# Patient Record
Sex: FEMALE | Race: BLACK OR AFRICAN AMERICAN | Employment: OTHER | URBAN - METROPOLITAN AREA
[De-identification: names, ages, dates, MRNs, and addresses within clinical notes are randomized per-mention and may not be internally consistent; named-entity substitution may affect disease eponyms.]

---

## 2020-12-04 ENCOUNTER — HOSPITAL ENCOUNTER (OUTPATIENT)
Dept: PHYSICAL THERAPY | Age: 65
Discharge: HOME | End: 2020-12-04
Payer: MEDICAID

## 2020-12-04 PROCEDURE — 97162 PT EVAL MOD COMPLEX 30 MIN: CPT
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
## 2020-12-04 NOTE — PROGRESS NOTES
PHYSICAL THERAPY - DAILY TREATMENT NOTE    Patient Name:  [address]        Date: 2020  : 1955   YES Patient  Verified  Visit #:     Insurance: Payor: [employer] / Plan: [address] [place] [address] / Product Type: Managed Care Medicaid /      In time: 8:30 Out time: 9:00   Total Treatment Time: 30     Medicare/BCBS Time Tracking (below)   Total Timed Codes (min):  na 1:1 Treatment Time:  na     TREATMENT AREA =  L shoulder pain (M25.512)    SUBJECTIVE    Pain Level (on 0 to 10 scale):  0  / 10   Medication Changes/New allergies or changes in medical history, any new surgeries or procedures? NO    If yes, update Summary List   Subjective Functional Status/Changes:  []  No changes reported     See POC          OBJECTIVE    10 NB min Self Care: Reviewed diagnosis, prognosis, therapy progression   Rationale:    Improve understanding of injury and therapy to have realistic expectation of therapy to improve compliance/adherence and satisfaction    Billed With/As:   [] TE   [] TA   [] Neuro   [x] Self Care Patient Education: [] Review HEP    [] Progressed/Changed HEP based on:   [] positioning   [] body mechanics   [] transfers   [] heat/ice application    [x] other: Reviewed diagnosis, prognosis, therapy progression       Other Objective/Functional Measures:    Evaluation only. Instructed on shoulder table slides and pendulum. Post Treatment Pain Level (on 0 to 10) scale:   0 / 10     ASSESSMENT  Assessment/Changes in Function:     See POC     []  See Progress Note/Recertification   Patient will continue to benefit from skilled PT services to modify and progress therapeutic interventions, address functional mobility deficits, address ROM deficits, address strength deficits, analyze and address soft tissue restrictions, analyze and cue movement patterns and analyze and modify body mechanics/ergonomics to attain goals per POC.    Progress toward goals / Updated goals:    See POC PLAN  [x]  Upgrade activities as tolerated YES Continue plan of care   []  Discharge due to :    [x]  Other: 2x week for 6 weeks      Therapist: Erica Kearney PT, DPT    Date: 12/4/2020 Time: 9:08 AM

## 2021-01-04 ENCOUNTER — HOSPITAL ENCOUNTER (OUTPATIENT)
Dept: PHYSICAL THERAPY | Age: 66
Discharge: HOME | End: 2021-01-04
Payer: MEDICAID

## 2021-01-04 PROCEDURE — 97530 THERAPEUTIC ACTIVITIES: CPT

## 2021-01-04 PROCEDURE — 97110 THERAPEUTIC EXERCISES: CPT

## 2021-01-04 PROCEDURE — 97535 SELF CARE MNGMENT TRAINING: CPT

## 2021-01-04 NOTE — PROGRESS NOTES
100 [facility] PHYSICAL THERAPY  [address] [place] [address] [address],[place] Estrada, 70 [facility] - Phone: [phone]  Fax: [phone]  PROGRESS NOTE  Patient Name: Maddy Mayorga : 1955   Treatment/Medical Diagnosis: Left shoulder pain [M25.512]   Referral Source: Magaly Gomez MD     Date of Initial Visit: 2020 Attended Visits: 2 Missed Visits: 0     SUMMARY OF TREATMENT  Patient is a 59 yo female attending 2 sessions (including initial evaluation) of OPPT at [facility] for Left shoulder pain (M25.512). Skilled physical therapy has consisted of therapeutic exercise with focus on L shoulder ROM, therapeutic activity with focus on reaching ability, manual therapy for improved L shoulder ROM, and modalities as needed for pain. CURRENT STATUS  Patient has only attended evaluation and 1 follow-up in the past month, unable to formally assess progress to date due to lack of attendance. Patient reports she is a little bit worse than she was at evaluation because she used her L arm more than typical over the holiday. ROM:   Left UE: flex 70* active/ 85* passive, ext 30*, abd 45*, Functional ER forehead, Functional IR belt-line,  ER 0*  Right UE: flex 170*, ext 45*, abd 165*, Functional ER T1, Functional IR T6,  ER 50*  Strength:   Left UE: flex 2+/5 , ext 4+/5, ER 4+/5, IR 5/5, Upper Trap 5/5, Bicep 5/5, Triceps 4+/5  FOTO not tested due to lack of attendance      Short Term Goal/Measure of Progress Goal Met? 1. Patient will demonstrate independence with HEP for self management of symptoms   Status at last Eval: Initial Eval  Current Status: HEP provided no   2. Decrease max pain to 4-5/10 o assist with overhead reaching   Status at last Eval: 10/10 max pain Current Status: 10/10 max pain no     Long Term Goal/Measure of Progress Goal Met? 1. Decrease max pain 1-2/10 to assist with overhead reaching and dressing.    Status at last Eval: 10/10 max pain Current Status: 10/10 max pain no   2. Improve FOTO Functional Status Score by 17 points in order to show significant functional improvement. Status at last Eval: 44/100 Current Status: NT no   3. Will rate a +5 on Global Rating of Change and be prepared to DC to HEP. Status at last Eval: Initial Eval Current Status: -2 no     4. Demonstrate L shoulder BO to T1 and FIR to 2\" below RUE for improved ability to perform ADLs. Status at last Eval: BO: forehead  FIR: belt-line Current Status: BO: forehead  FIR: belt-line no     Goals to be achieved in __6__  weeks:  1. Continue STG #1 and #2 above  2. Continue LTG #1-4 above     RECOMMENDATIONS  Patient will continue to benefit from skilled physical therapy in order to address remaining deficits so that patient can demonstrate improved LUE ROM and ADLs ability. If you have any questions/comments please contact us directly at [phone]. Thank you for allowing us to assist in the care of your patient. Therapist Signature: Joshua Kwan, DPDANILO  Date: 1/4/2021     Time: 7:19 AM   NOTE TO PHYSICIAN:  PLEASE COMPLETE THE ORDERS BELOW AND FAX TO   [facility]: [phone]  If you are unable to process this request in 24 hours please contact our office: [phone]    ___ I have read the above report and request that my patient continue as recommended.   ___ I have read the above report and request that my patient continue therapy with the following changes/special instructions:_________________________________________________________   ___ I have read the above report and request that my patient be discharged from therapy.      Physician Signature:        Date:       Time:

## 2021-01-04 NOTE — PROGRESS NOTES
PHYSICAL THERAPY - DAILY TREATMENT NOTE    Patient Name:  [address]        Date: 2021  : 1955   yes Patient  Verified  Visit #:      of     Insurance: Payor: Kayla Nguyen / Plan: [address] [place] [address] / Product Type: Managed Care Medicaid /      In time: 7:10 am Out time: 8:07 am   Total Treatment Time: 57      Medicare/BCBS Time Tracking (below)   Total Timed Codes (min):  na 1:1 Treatment Time:  na     TREATMENT AREA =  Left shoulder pain [M25.512]    SUBJECTIVE  Pain Level (on 0 to 10 scale):  2-3  / 10   Medication Changes/New allergies or changes in medical history, any new surgeries or procedures?    no  If yes, update Summary List   Subjective Functional Status/Changes:  []  No changes reported     Patient reports her shoulder isn't feeling too badly this morning, since its early. Did have pain and trouble getting on her seatbelt.            OBJECTIVE  Modalities Rationale:     decrease pain and increase tissue extensibility to improve patient's ability to perform ADLs   min [] Estim, type/location:                                      []  att     []  unatt     []  w/US     []  w/ice    []  w/heat    min []  Mechanical Traction: type/lbs                   []  pro   []  sup   []  int   []  cont    []  before manual    []  after manual    min []  Ultrasound, settings/location:      min []  Iontophoresis w/ dexamethasone, location:                                               []  take home patch       []  in clinic   10 min []  Ice     [x]  Heat    location/position: L shoulder in semi-reclined with knee bolster; end of session     min []  Vasopneumatic Device, press/temp:     min []  Other:    [x] Skin assessment post-treatment (if applicable):    [x]  intact    []  redness- no adverse reaction     []redness  adverse reaction:        15 min Therapeutic Exercise:  [x]  See flow sheet   Rationale:      increase ROM, increase strength and improve coordination to improve the patients ability to perform ADLs     7 min Manual Therapy: PROM L shoulder flexion, abd, ER; STM posterior capsule   Rationale:      decrease pain, increase ROM and increase tissue extensibility to improve patient's ability to perform ADLs and household duties  The manual therapy interventions were performed at a separate and distinct time from the therapeutic activities interventions. 15 min Therapeutic Activity: [x]  See flow sheet   Rationale:    increase ROM, increase strength and improve coordination to improve the patients ability to performing lifting and overhead reaching     10 min Self Care: Educated patient on POC and reassessed due to 1 month from evaluation. Provided patient with HEP and educated and performance. Rationale:     Improve understanding of injury and therapy to have realistic expectation of therapy to improve compliance/adherence and satisfaction    Billed With/As:   [x] TE   [x] TA   [] Neuro   [] Self Care Patient Education: [x] Review HEP    [] Progressed/Changed HEP based on:   [] positioning   [] body mechanics   [] transfers   [] heat/ice application    [x] other: Provided patient with HEP      Other Objective/Functional Measures:    Instructed patient in exercises per flow sheet with a focus on L shoulder ROM. Patient demonstrates very low tolerance for overall activity, becoming winding and SOB with standing exercises. Achieved about 95* flexion during pulleys. Reassessed due to 1 month gap between evaluation and follow-up session. Post Treatment Pain Level (on 0 to 10) scale:   0  / 10     ASSESSMENT  Assessment/Changes in Function:     Patient with decreased pain at end of session. Overall low tolerance for activity, especially standing exercises. Will benefit from modified exercise routine in seated or supine position.       []  See Progress Note/Recertification   Patient will continue to benefit from skilled PT services to modify and progress therapeutic interventions, address functional mobility deficits, address ROM deficits, address strength deficits, analyze and address soft tissue restrictions, analyze and cue movement patterns and analyze and modify body mechanics/ergonomics to attain remaining goals.    Progress toward goals / Updated goals:    See progress note     PLAN  [x]  Upgrade activities as tolerated yes Continue plan of care   []  Discharge due to :    []  Other:      Therapist: Brittanie Aguilar PT, DPT     Date: 1/4/2021 Time: 7:13 AM     Future Appointments   Date Time Provider Rubio Velasco   1/8/2021  9:30 AM Reny Vallejo, PTA [facility]   1/11/2021  7:00 AM Candis Reilly April, PT [facility]   1/13/2021  8:00 AM Candis Reilly April, PT [facility]   1/18/2021  7:00 AM Candis Reilly April, PT [facility]   1/20/2021  8:00 AM Candis Reilly April, [facility]   1/25/2021  7:00 AM Candis Reilly April, [facility]   1/27/2021  8:00 AM Candis Reilly April, PT [address]

## 2021-01-06 ENCOUNTER — APPOINTMENT (OUTPATIENT)
Dept: PHYSICAL THERAPY | Age: 66
End: 2021-01-06
Payer: MEDICAID

## 2021-01-08 ENCOUNTER — APPOINTMENT (OUTPATIENT)
Dept: PHYSICAL THERAPY | Age: 66
End: 2021-01-08
Payer: MEDICAID

## 2021-01-11 ENCOUNTER — HOSPITAL ENCOUNTER (OUTPATIENT)
Dept: PHYSICAL THERAPY | Age: 66
Discharge: HOME | End: 2021-01-11
Payer: MEDICAID

## 2021-01-11 PROCEDURE — 97112 NEUROMUSCULAR REEDUCATION: CPT

## 2021-01-11 PROCEDURE — 97530 THERAPEUTIC ACTIVITIES: CPT

## 2021-01-11 PROCEDURE — 97110 THERAPEUTIC EXERCISES: CPT

## 2021-01-11 NOTE — PROGRESS NOTES
PHYSICAL THERAPY - DAILY TREATMENT NOTE    Patient Name:  [address]        Date: 2021  : 1955   yes Patient  Verified  Visit #:   3   of   12  Insurance: Payor: Alonso Masterson / Plan: [address] [place] [address] / Product Type: Managed Care Medicaid /      In time: 7:08 am Out time: 8:00 am   Total Treatment Time: 52     Medicare/BCBS Time Tracking (below)   Total Timed Codes (min):  42 1:1 Treatment Time:  42     TREATMENT AREA =  Left shoulder pain [M25.512]    SUBJECTIVE  Pain Level (on 0 to 10 scale):  0  / 10   Medication Changes/New allergies or changes in medical history, any new surgeries or procedures?    no  If yes, update Summary List   Subjective Functional Status/Changes:  []  No changes reported     Patient reports her shoulder is feeling better because she hasn't been cooking as much at home. Felt really good after the last session.            OBJECTIVE  Modalities Rationale:     decrease pain and increase tissue extensibility to improve patient's ability to perform ADLs with decreased pain    min [] Estim, type/location:                                      []  att     []  unatt     []  w/US     []  w/ice    []  w/heat    min []  Mechanical Traction: type/lbs                   []  pro   []  sup   []  int   []  cont    []  before manual    []  after manual    min []  Ultrasound, settings/location:      min []  Iontophoresis w/ dexamethasone, location:                                               []  take home patch       []  in clinic   10 NB min []  Ice     [x]  Heat    location/position: L shoulder in long sitting; post-session     min []  Vasopneumatic Device, press/temp:     min []  Other:    [x] Skin assessment post-treatment (if applicable):    [x]  intact    []  redness- no adverse reaction     []redness  adverse reaction:        15 min Therapeutic Exercise:  [x]  See flow sheet   Rationale:      increase ROM, increase strength and improve coordination to improve the patients ability to perform overhead reaching with decreased pain      15 min Therapeutic Activity: [x]  See flow sheet   Rationale:    increase ROM and increase strength to improve the patients ability to perform ADLs and lifting with decreased pain     12 min Neuromuscular Re-ed: [x]  See flow sheet   Rationale:    increase ROM and improve coordination to improve the patients ability to tolerate LUE reaching with decreased pain and improved posture awareness    Billed With/As:   [x] TE   [x] TA   [] Neuro   [] Self Care Patient Education: [x] Review HEP    [] Progressed/Changed HEP based on:   [] positioning   [] body mechanics   [] transfers   [] heat/ice application    [] other:      Other Objective/Functional Measures: Added pendulum with cuing for correct positioning and relaxation of LUE. Cuing during pulleys to allow LUE to relax, patient able to tolerate about 170 flexion by end of 4 minutes. Added supine cane flexion with patient reporting increased discomfort with repetitive activity. Post Treatment Pain Level (on 0 to 10) scale:   0  / 10     ASSESSMENT  Assessment/Changes in Function:     Patient demonstrates good tolerance for exercise and activity today though slight increase in pain during repetitive shoulder flexion. No pain at end of session. []  See Progress Note/Recertification   Patient will continue to benefit from skilled PT services to modify and progress therapeutic interventions, address functional mobility deficits, address ROM deficits, address strength deficits, analyze and address soft tissue restrictions, analyze and cue movement patterns and analyze and modify body mechanics/ergonomics to attain remaining goals.    Progress toward goals / Updated goals:    Met STG #1     PLAN  [x]  Upgrade activities as tolerated yes Continue plan of care   []  Discharge due to :    []  Other:      Therapist: Elena Miguel PT, DPT     Date: 1/11/2021 Time: 10:03 AM     Future Appointments   Date Time Provider Rubio Velasco   1/13/2021  8:00 AM Lyndol Liter, April, [facility]   1/18/2021  7:00 AM Lyndol Liter, April, [facility]   1/20/2021  8:00 AM Lyndol Liter, April, [facility]   1/25/2021  7:00 AM Lyndol Liter, April, [place] [address]   1/27/2021  8:00 AM Lyndol Liter, April, [place] [address]

## 2021-01-13 ENCOUNTER — HOSPITAL ENCOUNTER (OUTPATIENT)
Dept: PHYSICAL THERAPY | Age: 66
Discharge: HOME | End: 2021-01-13
Payer: MEDICAID

## 2021-01-13 PROCEDURE — 97112 NEUROMUSCULAR REEDUCATION: CPT

## 2021-01-13 PROCEDURE — 97530 THERAPEUTIC ACTIVITIES: CPT

## 2021-01-13 PROCEDURE — 97110 THERAPEUTIC EXERCISES: CPT

## 2021-01-13 NOTE — PROGRESS NOTES
PHYSICAL THERAPY - DAILY TREATMENT NOTE    Patient Name:  [address]        Date: 2021  : 1955   yes Patient  Verified  Visit #:     Insurance: Payor: Gisele Ganser / Plan: [address] [place] [address] / Product Type: Managed Care Medicaid /      In time: 8:03 am Out time: 8:58 am   Total Treatment Time: 55     Medicare/BCBS Time Tracking (below)   Total Timed Codes (min):  45 1:1 Treatment Time:  45     TREATMENT AREA =  Left shoulder pain [M25.512]    SUBJECTIVE  Pain Level (on 0 to 10 scale):  0  / 10   Medication Changes/New allergies or changes in medical history, any new surgeries or procedures?    no  If yes, update Summary List   Subjective Functional Status/Changes:  []  No changes reported     Patient reports her shoulder really is starting to feel better, noticing she is able to use it for longer periods without it hurting as much. Muscles are starting to loosen up.        OBJECTIVE  Modalities Rationale:     decrease pain and increase tissue extensibility to improve patient's ability to perform reaching with decreased pain    min [] Estim, type/location:                                      []  att     []  unatt     []  w/US     []  w/ice    []  w/heat    min []  Mechanical Traction: type/lbs                   []  pro   []  sup   []  int   []  cont    []  before manual    []  after manual    min []  Ultrasound, settings/location:      min []  Iontophoresis w/ dexamethasone, location:                                               []  take home patch       []  in clinic   10 nb min []  Ice     [x]  Heat    location/position: L shoulder in semi-reclined position with knee bolster    min []  Vasopneumatic Device, press/temp:     min []  Other:    [x] Skin assessment post-treatment (if applicable):    [x]  intact    []  redness- no adverse reaction     []redness  adverse reaction:        15 min Therapeutic Exercise:  [x]  See flow sheet   Rationale:      increase ROM, increase strength and improve coordination to improve the patients ability to perform ADLs and overhead reaching     7 nb min Manual Therapy: PROM L shoulder flexion, abd, ER; STM posterior capsule, lateral deltoid, LHB tendon   Rationale:      decrease pain, increase ROM and increase tissue extensibility to improve patient's ability to perform overhead lifting  The manual therapy interventions were performed at a separate and distinct time from the therapeutic activities interventions. 15 min Therapeutic Activity: [x]  See flow sheet   Rationale:    increase ROM, increase strength and improve coordination to improve the patients ability to perform lifting and overhead lifting     8 min Neuromuscular Re-ed: [x]  See flow sheet   Rationale:    increase ROM and improve coordination to improve the patients ability to perform overhead lifting with decreased pain     Billed With/As:   [] TE   [] TA   [] Neuro   [] Self Care Patient Education: [x] Review HEP    [] Progressed/Changed HEP based on:   [] positioning   [] body mechanics   [] transfers   [] heat/ice application    [] other:      Other Objective/Functional Measures: Added bodyblade (small) with cuing of proper performance in order to working on shoulder endurance & awareness and to replicate repetitive nature of cooking. Patient requiring cuing for proper performance of scap squeeze as patient shrugging. Demonstrates improved forward flexion during pulleys. Post Treatment Pain Level (on 0 to 10) scale:   0  / 10     ASSESSMENT  Assessment/Changes in Function:     Patient with good tolerance of exercises and activities today, reporting soreness with repetitive flexion. Reports no pain at end of session.       []  See Progress Note/Recertification   Patient will continue to benefit from skilled PT services to modify and progress therapeutic interventions, address functional mobility deficits, address ROM deficits, address strength deficits, analyze and address soft tissue restrictions, analyze and cue movement patterns and analyze and modify body mechanics/ergonomics to attain remaining goals. Progress toward goals / Updated goals: · Short Term Goals: To be accomplished in  2  weeks:  1. Patient will demonstrate independence with HEP for self management of symptoms MET  2. Decrease max pain to 4-5/10 o assist with overhead reaching Progressing 1/13, pain 0/10 at start and end of session, up to 4/10 during exercises  · Long Term Goals: To be accomplished in  6  weeks:  1. Decrease max pain 1-2/10 to assist with overhead reaching and dressing. 2.  Improve FOTO Functional Status Score by 17 points in order to show significant functional improvement. 3.  Will rate a +5 on Global Rating of Change and be prepared to DC to HEP. 4. Demonstrate L shoulder BO to T1 and FIR to 2\" below RUE for improved ability to perform ADLs.      PLAN  [x]  Upgrade activities as tolerated yes Continue plan of care   []  Discharge due to :    []  Other:      Therapist: Nahomi Dickey, PT    Date: 1/13/2021 Time: 8:05 AM     Future Appointments   Date Time Provider Rubio Velasco   1/18/2021  7:00 AM [address] [facility]   1/20/2021  8:00 AM [address] [facility]   1/25/2021  7:00 AM Connee Si, [place], [place] [address] [facility]   1/27/2021  8:00 AM [facility], PT [address]

## 2021-01-18 ENCOUNTER — HOSPITAL ENCOUNTER (OUTPATIENT)
Dept: PHYSICAL THERAPY | Age: 66
Discharge: HOME | End: 2021-01-18
Payer: MEDICAID

## 2021-01-18 PROCEDURE — 97110 THERAPEUTIC EXERCISES: CPT

## 2021-01-18 PROCEDURE — 97530 THERAPEUTIC ACTIVITIES: CPT

## 2021-01-18 PROCEDURE — 97112 NEUROMUSCULAR REEDUCATION: CPT

## 2021-01-18 NOTE — PROGRESS NOTES
PHYSICAL THERAPY - DAILY TREATMENT NOTE    Patient Name: Rolly Borrego        Date: 2021  : 1955   yes Patient  Verified  Visit #:     Insurance: Payor: [address] / Plan: [address] / Product Type: Managed Care Medicaid /      In time: 7:11 Out time: 8:00   Total Treatment Time: 49     Medicare/BCBS Time Tracking (below)   Total Timed Codes (min):  39 1:1 Treatment Time:  39     TREATMENT AREA =  Left shoulder pain [M25.512]    SUBJECTIVE  Pain Level (on 0 to 10 scale):  3-4  / 10   Medication Changes/New allergies or changes in medical history, any new surgeries or procedures?    no  If yes, update Summary List   Subjective Functional Status/Changes:  []  No changes reported     Patient reports her shoulder is hurting a little bit this morning because her washing machine was leaking so she had to move it last night.         OBJECTIVE  Modalities Rationale:     decrease pain and increase tissue extensibility to improve patient's ability to tolerate light household duties and overhead reaching   min [] Estim, type/location:                                      []  att     []  unatt     []  w/US     []  w/ice    []  w/heat    min []  Mechanical Traction: type/lbs                   []  pro   []  sup   []  int   []  cont    []  before manual    []  after manual    min []  Ultrasound, settings/location:      min []  Iontophoresis w/ dexamethasone, location:                                               []  take home patch       []  in clinic   10 min []  Ice     [x]  Heat    location/position: L shoulder in semi-reclined position with knee bolster    min []  Vasopneumatic Device, press/temp:     min []  Other:    [x] Skin assessment post-treatment (if applicable):    [x]  intact    []  redness- no adverse reaction     []redness  adverse reaction:        15 min Therapeutic Exercise:  [x]  See flow sheet   Rationale:      increase ROM, increase strength and improve coordination to improve the patients ability to tolerate light household duties      15 min Therapeutic Activity: [x]  See flow sheet   Rationale:    increase ROM, increase strength and improve coordination to improve the patients ability to tolerate lifting and pushing/pulling with decreased pain     9 min Neuromuscular Re-ed: [x]  See flow sheet   Rationale:    improve coordination and increase proprioception to improve the patients ability to  Tolerate light household duties with improved L shoulder and body awareness     Billed With/As:   [x] TE   [x] TA   [] Neuro   [] Self Care Patient Education: [x] Review HEP    [] Progressed/Changed HEP based on:   [] positioning   [] body mechanics   [] transfers   [] heat/ice application    [] other:      Other Objective/Functional Measures: Added seated t-band row and ext with OTB for increased L shoulder and scapular strength & stability, cuing for proper positioning. Increased pain during supine cane flexion. Post Treatment Pain Level (on 0 to 10) scale:   2  / 10     ASSESSMENT  Assessment/Changes in Function:     Decreased pain at end of session. Tolerated new exercise well without causing increase in discomfort. Demonstrates increased tolerance during pulleys, up to 165 deg. []  See Progress Note/Recertification   Patient will continue to benefit from skilled PT services to modify and progress therapeutic interventions, address functional mobility deficits, address ROM deficits, address strength deficits, analyze and address soft tissue restrictions, analyze and cue movement patterns and analyze and modify body mechanics/ergonomics to attain remaining goals. Progress toward goals / Updated goals: · Short Term Goals: To be accomplished in  2  weeks:  1. Patient will demonstrate independence with HEP for self management of symptoms MET  2.  Decrease max pain to 4-5/10 o assist with overhead reaching Progressing 1/18     PLAN  [x]  Upgrade activities as tolerated yes Continue plan of care   []  Discharge due to :    []  Other:      Therapist: Sancho Schwartz, PT, DPT     Date: 1/18/2021 Time: 7:43 AM     Future Appointments   Date Time Provider Rubio Velasco   1/20/2021  8:00 [facility] [address]   1/25/2021  7:00 AM Lynshekhar Mohan, [place], [facility] [address]   1/27/2021  8:00 AM Glen Mohan, [place], PT [address]

## 2021-01-20 ENCOUNTER — HOSPITAL ENCOUNTER (OUTPATIENT)
Dept: PHYSICAL THERAPY | Age: 66
Discharge: HOME | End: 2021-01-20
Payer: MEDICAID

## 2021-01-20 PROCEDURE — 97110 THERAPEUTIC EXERCISES: CPT

## 2021-01-20 PROCEDURE — 97530 THERAPEUTIC ACTIVITIES: CPT

## 2021-01-20 NOTE — PROGRESS NOTES
PHYSICAL THERAPY - DAILY TREATMENT NOTE    Patient Name:  [address]        Date: 2021  : 1955   yes Patient  Verified  Visit #:     Insurance: Payor: [address] / Plan: [address] / Product Type: Managed Care Medicaid /      In time: 8:15 am Out time: 8:58 am   Total Treatment Time: 43     Medicare/BCBS Time Tracking (below)   Total Timed Codes (min):  33 1:1 Treatment Time:  33     TREATMENT AREA =  Left shoulder pain [M25.512]    SUBJECTIVE  Pain Level (on 0 to 10 scale):  2  / 10   Medication Changes/New allergies or changes in medical history, any new surgeries or procedures?    no  If yes, update Summary List   Subjective Functional Status/Changes:  []  No changes reported   (Patient is 15 mins late due to transportation issues)  Patient reports her shoulder is feeling pretty good today. Felt better after the last session.          OBJECTIVE  Modalities Rationale:     decrease pain and increase tissue extensibility to improve patient's ability to tolerate reaching with decreased pain    min [] Estim, type/location:                                      []  att     []  unatt     []  w/US     []  w/ice    []  w/heat    min []  Mechanical Traction: type/lbs                   []  pro   []  sup   []  int   []  cont    []  before manual    []  after manual    min []  Ultrasound, settings/location:      min []  Iontophoresis w/ dexamethasone, location:                                               []  take home patch       []  in clinic   10 min []  Ice     [x]  Heat    location/position: L shoulder in semi-reclined position with bolster    min []  Vasopneumatic Device, press/temp:     min []  Other:    [x] Skin assessment post-treatment (if applicable):    [x]  intact    []  redness- no adverse reaction     []redness  adverse reaction:        18 min Therapeutic Exercise:  [x]  See flow sheet   Rationale:      increase ROM, increase strength and improve coordination to improve the patients ability to tolerate overhead reaching and ADLs with decreased pain      15 min Therapeutic Activity: [x]  See flow sheet   Rationale:    increase ROM, increase strength and improve coordination to improve the patients ability to tolerate lifting and reaching with decreased pain     Billed With/As:   [x] TE   [x] TA   [] Neuro   [] Self Care Patient Education: [x] Review HEP    [] Progressed/Changed HEP based on:   [] positioning   [] body mechanics   [] transfers   [] heat/ice application    [] other:      Other Objective/Functional Measures:    No change in program today. Patient demonstrates improved tolerance for L shoulder flexion during pulleys, up to 165 deg. Post Treatment Pain Level (on 0 to 10) scale:   0  / 10     ASSESSMENT  Assessment/Changes in Function:     Patient with improving L shoulder ROM and decreased pain at end of session. []  See Progress Note/Recertification   Patient will continue to benefit from skilled PT services to modify and progress therapeutic interventions, address functional mobility deficits, address ROM deficits, address strength deficits, analyze and address soft tissue restrictions, analyze and cue movement patterns and analyze and modify body mechanics/ergonomics to attain remaining goals. Progress toward goals / Updated goals:  Short Term Goals: To be accomplished in  2  weeks:  1. Patient will demonstrate independence with HEP for self management of symptoms MET  2.  Decrease max pain to 4-5/10 o assist with overhead reaching Progressing 1/20, max pain 2/10 for past 3 days     PLAN  [x]  Upgrade activities as tolerated yes Continue plan of care   []  Discharge due to :    []  Other:      Therapist: Erica Saunders PT, DPT     Date: 1/20/2021 Time: 8:16 AM     Future Appointments   Date Time Provider Rubio Velasco   1/25/2021  7:00 AM Jennifer Sessions, [place], [place] [facility]   1/27/2021  8:00 AM Jennifer Davidson, April, [address] [facility]   2/1/2021 10:15 AM [facility]   2/3/2021 10:15 AM [facility]   2/8/2021  8:45 AM Woody HamiltonTC [facility]   2/10/2021  8:45 AM Woody HamiltonTC [facility]   2/15/2021  9:30 AM Woody HamiltonTC [facility]   2/17/2021  8:45 AM Woody HamiltonTC [facility]   2/22/2021  8:45 AM Woody HamiltonTC [facility]   2/24/2021  8:45 AM Mohsen Son  [facility]

## 2021-01-25 ENCOUNTER — HOSPITAL ENCOUNTER (OUTPATIENT)
Dept: PHYSICAL THERAPY | Age: 66
Discharge: HOME | End: 2021-01-25
Payer: MEDICAID

## 2021-01-25 PROCEDURE — 97110 THERAPEUTIC EXERCISES: CPT

## 2021-01-25 PROCEDURE — 97530 THERAPEUTIC ACTIVITIES: CPT

## 2021-01-25 PROCEDURE — 97112 NEUROMUSCULAR REEDUCATION: CPT

## 2021-01-27 ENCOUNTER — HOSPITAL ENCOUNTER (OUTPATIENT)
Dept: PHYSICAL THERAPY | Age: 66
Discharge: HOME | End: 2021-01-27
Payer: MEDICAID

## 2021-01-27 PROCEDURE — 97110 THERAPEUTIC EXERCISES: CPT

## 2021-01-27 PROCEDURE — 97530 THERAPEUTIC ACTIVITIES: CPT

## 2021-01-27 PROCEDURE — 97112 NEUROMUSCULAR REEDUCATION: CPT

## 2021-01-27 NOTE — PROGRESS NOTES
PHYSICAL THERAPY - DAILY TREATMENT NOTE    Patient Name:  [address]        Date: 2021  : 1955   yes Patient  Verified  Visit #:     Insurance: Payor: [address] / Plan: [address] / Product Type: Managed Care Medicaid /      In time: 8:04 am Out time: 9:07   Total Treatment Time: 63     Medicare/BCBS Time Tracking (below)   Total Timed Codes (min):  53 1:1 Treatment Time:  53     TREATMENT AREA =  Left shoulder pain [M25.512]    SUBJECTIVE  Pain Level (on 0 to 10 scale):  4  / 10   Medication Changes/New allergies or changes in medical history, any new surgeries or procedures?    no  If yes, update Summary List   Subjective Functional Status/Changes:  []  No changes reported     Patient reports her shoulder is hurting a little bit this morning because she cleaned out her oven.          OBJECTIVE  Modalities Rationale:     decrease pain and increase tissue extensibility to improve patient's ability to perform ADLs   min [] Estim, type/location:                                      []  att     []  unatt     []  w/US     []  w/ice    []  w/heat    min []  Mechanical Traction: type/lbs                   []  pro   []  sup   []  int   []  cont    []  before manual    []  after manual    min []  Ultrasound, settings/location:      min []  Iontophoresis w/ dexamethasone, location:                                               []  take home patch       []  in clinic   10 min []  Ice     [x]  Heat    location/position: L shoulder in semi-reclined with bolster    min []  Vasopneumatic Device, press/temp:     min []  Other:    [x] Skin assessment post-treatment (if applicable):    [x]  intact    []  redness- no adverse reaction     []redness - adverse reaction:        20 min Therapeutic Exercise:  [x]  See flow sheet   Rationale:      increase ROM, increase strength and improve coordination to improve the patients ability to perform ADLs     7 nb min Manual Therapy: STM/DTM L UT, posterior rotator cuff, pec major, lat deltoid   Rationale:      decrease pain, increase ROM and increase tissue extensibility to improve patient's ability to perform ADLs  The manual therapy interventions were performed at a separate and distinct time from the therapeutic activities interventions. 18 min Therapeutic Activity: [x]  See flow sheet   Rationale:    increase ROM, increase strength and improve coordination to improve the patients ability to perform ADLs and cleaning with decreased pain     8 min Neuromuscular Re-ed: [x]  See flow sheet   Rationale:    increase strength, improve coordination and increase proprioception to improve the patients ability to perform ADLs    Billed With/As:   [x] TE   [x] TA   [] Neuro   [] Self Care Patient Education: [x] Review HEP    [] Progressed/Changed HEP based on:   [] positioning   [] body mechanics   [] transfers   [] heat/ice application    [] other:      Other Objective/Functional Measures: Added IR/ER isometric for rotator cuff strength. Demonstrates significant shoulder hike during active shoulder scaption. Supine PROM L shoulder flex 124*     Post Treatment Pain Level (on 0 to 10) scale:   0  / 10     ASSESSMENT  Assessment/Changes in Function:     Patient demonstrates improved L shoulder PROM, up to 124* passively. []  See Progress Note/Recertification   Patient will continue to benefit from skilled PT services to modify and progress therapeutic interventions, address functional mobility deficits, address ROM deficits, address strength deficits, analyze and address soft tissue restrictions, analyze and cue movement patterns, analyze and modify body mechanics/ergonomics and assess and modify postural abnormalities to attain remaining goals.    Progress toward goals / Updated goals:    Progressing toward STG #2     PLAN  [x]  Upgrade activities as tolerated yes Continue plan of care   []  Discharge due to :    []  Other: Therapist: Joshua Wang, DPT     Date: 1/27/2021 Time: 8:06 AM     Future Appointments   Date Time Provider Rubio Velasco   2/1/2021 10:15 AM Stacey Paulson [facility]   2/3/2021 10:15 AM Stacey Paulson [facility]   2/8/2021  8:45 AM Marcie Hamilton [facility]   2/10/2021  8:45 AM Marcie Hamilton [facility]   2/15/2021  9:30 AM Marcie Hamilton [facility]   2/17/2021  8:45 AM Marcie Hamilton [facility]   2/22/2021  8:45 AM Marcie Hamilton [facility]   2/24/2021  8:45 AM Marcie Hamilton [facility]

## 2021-02-01 ENCOUNTER — HOSPITAL ENCOUNTER (OUTPATIENT)
Dept: PHYSICAL THERAPY | Age: 66
Discharge: HOME | End: 2021-02-01
Payer: MEDICAID

## 2021-02-01 PROCEDURE — 97110 THERAPEUTIC EXERCISES: CPT

## 2021-02-01 PROCEDURE — 97530 THERAPEUTIC ACTIVITIES: CPT

## 2021-02-01 NOTE — PROGRESS NOTES
PHYSICAL THERAPY - DAILY TREATMENT NOTE    Patient Name:  [address]        Date: 2021  : 1955   yes Patient  Verified  Visit #:     Insurance: Payor: Noelle Lizandrons / Plan: [address] [place] [address] / Product Type: Managed Care Medicaid /      In time: 842 Out time: 1110   Total Treatment Time: 45     Medicare/BCBS Time Tracking (below)   Total Timed Codes (min):  35 1:1 Treatment Time:  35     TREATMENT AREA =  Left shoulder pain [M25.512]    SUBJECTIVE  Pain Level (on 0 to 10 scale):  3  / 10   Medication Changes/New allergies or changes in medical history, any new surgeries or procedures?    no  If yes, update Summary List   Subjective Functional Status/Changes:  []  No changes reported     This therapy stuff is helping. OBJECTIVE  Modalities Rationale:     decrease pain and increase tissue extensibility to improve patient's ability to perform pain free ADLs. min [] Estim, type/location:                                      []  att     []  unatt     []  w/US     []  w/ice    []  w/heat    min []  Mechanical Traction: type/lbs                   []  pro   []  sup   []  int   []  cont    []  before manual    []  after manual    min []  Ultrasound, settings/location:      min []  Iontophoresis w/ dexamethasone, location:                                               []  take home patch       []  in clinic   10 min []  Ice     [x]  Heat    location/position: Supine, (L) shoulder. min []  Vasopneumatic Device, press/temp:     min []  Other:    [x] Skin assessment post-treatment (if applicable):    [x]  intact    []  redness- no adverse reaction     []redness  adverse reaction:        15 min Therapeutic Exercise:  [x]  See flow sheet   Rationale:      increase ROM and increase strength to improve the patients ability to perform pain free ADLs.      7 (NB) Min Manual Therapy: STM/DTM (L) pec minor.     Rationale:      decrease pain, increase ROM, increase tissue extensibility and decrease trigger points to improve patient's ability to perform pain free ADLs. The manual therapy interventions were performed at a separate and distinct time from the therapeutic activities interventions. 13 min Therapeutic Activity: [x]  See flow sheet   Rationale:    increase ROM and increase strength to improve the patients ability to perform pain free ADLs. Billed With/As:   [x] TE   [] TA   [] Neuro   [] Self Care Patient Education: [x] Review HEP    [] Progressed/Changed HEP based on:   [] positioning   [] body mechanics   [] transfers   [] heat/ice application    [] other:      Other Objective/Functional Measures: Therex per flow sheet. Post Treatment Pain Level (on 0 to 10) scale:   0   / 10     ASSESSMENT  Assessment/Changes in Function:     No exacerbation of symptoms with today's session. []  See Progress Note/Recertification   Patient will continue to benefit from skilled PT services to modify and progress therapeutic interventions, address functional mobility deficits, address ROM deficits, address strength deficits, analyze and address soft tissue restrictions, analyze and cue movement patterns, analyze and modify body mechanics/ergonomics and assess and modify postural abnormalities to attain remaining goals. Progress toward goals / Updated goals:    No change in progress toward LTG's with today's session.       PLAN  [x]  Upgrade activities as tolerated yes Continue plan of care   []  Discharge due to :    []  Other:      Therapist: Ruth Farrell PTA    Date: 2/1/2021 Time: 11:03 AM     Future Appointments   Date Time Provider Rubio Velasco   2/3/2021 10:15 AM Susana Wilkinson [facility] [address]   2/8/2021  8:45 AM HamiltonPiedad [facility] [address]   2/10/2021  8:45 AM HamiltonTeresaa Pufacundo [facility] [address]   2/15/2021  9:30 AM Hamilton, Piedad Roberta [facility] [address]   2/17/2021  8:45 AM Hamilton, Piedad Puffer [facility] [address]   2/22/2021  8:45 AM HamiltonTeresaa Pufacundo [facility] [address]   2/24/2021  8:45 AM Jackelin Hamilton [facility]

## 2021-02-03 ENCOUNTER — HOSPITAL ENCOUNTER (OUTPATIENT)
Dept: PHYSICAL THERAPY | Age: 66
Discharge: HOME | End: 2021-02-03
Payer: MEDICAID

## 2021-02-03 PROCEDURE — 97110 THERAPEUTIC EXERCISES: CPT

## 2021-02-03 PROCEDURE — 97530 THERAPEUTIC ACTIVITIES: CPT

## 2021-02-03 NOTE — PROGRESS NOTES
PHYSICAL THERAPY - DAILY TREATMENT NOTE    Patient Name:  [address]        Date: 2/3/2021  : 1955   yes Patient  Verified  Visit #:   10   of   12  Insurance: Payor: Holly Mar / Plan: [address] [place] [address] / Product Type: Managed Care Medicaid /      In time: 1395 Out time: 1110   Total Treatment Time: 55     Medicare/BCBS Time Tracking (below)   Total Timed Codes (min):  45 1:1 Treatment Time:       TREATMENT AREA =  Left shoulder pain [M25.512]    SUBJECTIVE  Pain Level (on 0 to 10 scale):  3  / 10   Medication Changes/New allergies or changes in medical history, any new surgeries or procedures?    no  If yes, update Summary List   Subjective Functional Status/Changes:  []  No changes reported     I had a rough van ride over here. It was feeling fine before but that Rachele Gleason was bouncing me all over the place. Pt reporting 3/10 recent max pain. Pt reporting 50% improvement in symptoms since beginning PT.         OBJECTIVE  Modalities Rationale:     decrease pain and increase tissue extensibility to improve patient's ability to perform pain free ADLs. min [] Estim, type/location:                                      []  att     []  unatt     []  w/US     []  w/ice    []  w/heat    min []  Mechanical Traction: type/lbs                   []  pro   []  sup   []  int   []  cont    []  before manual    []  after manual    min []  Ultrasound, settings/location:      min []  Iontophoresis w/ dexamethasone, location:                                               []  take home patch       []  in clinic   10 min []  Ice     [x]  Heat    location/position: Supine, (L) shoulder.      min []  Vasopneumatic Device, press/temp:     min []  Other:    [x] Skin assessment post-treatment (if applicable):    [x]  intact    []  redness- no adverse reaction     []redness  adverse reaction:        22 min Therapeutic Exercise:  [x]  See flow sheet   Rationale:      increase ROM and increase strength to improve the patients ability to perform pain free ADLs.       7 (NB) Min Manual Therapy: STM/DTM (L) pec minor. Rationale:      decrease pain, increase ROM, increase tissue extensibility and decrease trigger points to improve patient's ability to perform pain free ADLs. The manual therapy interventions were performed at a separate and distinct time from the therapeutic activities interventions. 16 min Therapeutic Activity: [x]  See flow sheet   Rationale:    increase ROM, increase strength, improve coordination and improve balance to improve the patients ability to perform pain free ADLs. Billed With/As:   [x] TE   [] TA   [] Neuro   [] Self Care Patient Education: [x] Review HEP    [] Progressed/Changed HEP based on:   [] positioning   [] body mechanics   [] transfers   [] heat/ice application    [] other:      Other Objective/Functional Measures:    FOTO = 57   GROC = +7     (L) shoulder AROM:   Flex/Scap = 122 deg   BO to C1   FIR to (L) glute      Post Treatment Pain Level (on 0 to 10) scale:   0   / 10     ASSESSMENT  Assessment/Changes in Function:     See PN     []  See Progress Note/Recertification   Patient will continue to benefit from skilled PT services to modify and progress therapeutic interventions, address functional mobility deficits, address ROM deficits, address strength deficits, analyze and address soft tissue restrictions, analyze and cue movement patterns, analyze and modify body mechanics/ergonomics and assess and modify postural abnormalities to attain remaining goals.    Progress toward goals / Updated goals:    See PN     PLAN  [x]  Upgrade activities as tolerated yes Continue plan of care   []  Discharge due to :    []  Other:      Therapist: Watson Alvarado PTA    Date: 2/3/2021 Time: 10:22 AM     Future Appointments   Date Time Provider Rubio Velasco   2/8/2021  8:45 AM Yashira Hamilton [facility] [address]   2/10/2021  8:45 AM Yashira Hamilton [facility] [address]   2/15/2021  9:30 AM Grey Quintanilla [address] [facility]   2/17/2021  8:45 AM Jose A Hamilton [facility]   2/22/2021  8:45 AM Jose A Hamilton [facility]   2/24/2021  8:45 AM Jose A Hamilton [address] [facility]

## 2021-02-03 NOTE — PROGRESS NOTES
[address] [facility] PHYSICAL THERAPY  [address] [place] [address] 51, [facility] 201,[facility], 70 [facility] - Phone: [phone]  Fax: [phone]  PROGRESS NOTE  Patient Name: Mac Essex : 1955   Treatment/Medical Diagnosis: Left shoulder pain [M25.512]   Referral Source: Ann Stanley MD     Date of Initial Visit: 2020 Attended Visits: 10 Missed Visits: 0     SUMMARY OF TREATMENT  Pt has attended 10 PT sessions consisting of initial evaluation, therapeutic exercises, HEP, manual therapy, patient education, and modalities to improve (L) shoulder PROM, strength, decrease pain and prepare for DC to HEP. CURRENT STATUS  [facility] ([facility]. Pt currently reporting 3/10 recent max pain with 50% improvement in symptoms since beginning PT. Current FOTO = 57, GROC= +7. (L) shoulder ROM remains limited. Current flex/scap = 122 deg, BO to C1, FIR to (L) glute. Pt is partially compliant with HEP. Goal/Measure of Progress Goal Met? 1. Patient will demonstrate independence with HEP for self management of symptoms   Status at last Eval: HEP provided  Current Status: Partially compliant with HEP progressing   2. Improve FOTO Functional Status Score by 17 points in order to show significant functional improvement. Status at last Eval: 44 Current Status: 57 progressing   3. Will rate a +5 on Global Rating of Change and be prepared to DC to HEP. Status at last Eval: -2  Current Status: +7 yes     4. Demonstrate L shoulder BO to T1 and FIR to 2\" below RUE for improved ability to perform ADLs. Status at last Eval: BO forehead  FIR belt-line Current Status: BO C1   FIR (L) glute no     New Goals to be achieved in __4__  weeks:  1. Patient will demonstrate independence with HEP for self management of symptoms  2. Improve FOTO Functional Status Score by 17 points in order to show significant functional improvement.   3. Demonstrate L shoulder BO to T1 and FIR to 2\" below RUE for improved ability to perform ADLs. RECOMMENDATIONS  Pt to continue 2x weekly for up to an additional 4 weeks to further improve shoulder strength/stability for ADLs, decrease pain, and prepare for DC to HEP. Thank you for this referral.    If you have any questions/comments please contact us directly at [phone]. Thank you for allowing us to assist in the care of your patient. LPTA Signature: Neyda Núñez PTA  Date: 2/3/2021   PT Signature:  Time: 11:22 AM   NOTE TO PHYSICIAN:  PLEASE COMPLETE THE ORDERS BELOW AND FAX TO   [facility] [place] Physical Therapy: [phone]  If you are unable to process this request in 24 hours please contact our office: [phone]    ___ I have read the above report and request that my patient continue as recommended.   ___ I have read the above report and request that my patient continue therapy with the following changes/special instructions:_________________________________________________________   ___ I have read the above report and request that my patient be discharged from therapy.      Physician Signature:        Date:       Time:

## 2021-02-08 ENCOUNTER — HOSPITAL ENCOUNTER (OUTPATIENT)
Dept: PHYSICAL THERAPY | Age: 66
Discharge: HOME | End: 2021-02-08
Payer: MEDICAID

## 2021-02-08 PROCEDURE — 97535 SELF CARE MNGMENT TRAINING: CPT

## 2021-02-08 PROCEDURE — 97110 THERAPEUTIC EXERCISES: CPT

## 2021-02-08 PROCEDURE — 97530 THERAPEUTIC ACTIVITIES: CPT

## 2021-02-08 NOTE — PROGRESS NOTES
PHYSICAL THERAPY - DAILY TREATMENT NOTE    Patient Name:  [address]        Date: 2021  : 1955   yes Patient  Verified  Visit #:     Insurance: Payor: Paulo Poll / Plan: [address] [place] [address] / Product Type: Managed Care Medicaid /       In time: 8:44 Out time: 9:40   Total Treatment Time: 56     Medicare/BCBS Time Tracking (below)   Total Timed Codes (min):  39 1:1 Treatment Time: 39     TREATMENT AREA =  Left shoulder pain [M25.512]    SUBJECTIVE  Pain Level (on 0 to 10 scale):  2  / 10   Medication Changes/New allergies or changes in medical history, any new surgeries or procedures?    no  If yes, update Summary List   Subjective Functional Status/Changes:  []  No changes reported     Patient reports not feeling too bad this morning. States that she has been cooking small dinners for herself but avoids cooking big family dinners due to the stress it puts on her L shoulder. Denies any falls or red flags since LV. OBJECTIVE  Modalities Rationale:     decrease pain and increase tissue extensibility to improve patient's ability to perform pain free ADLs. min [] Estim, type/location:                                      []  att     []  unatt     []  w/US     []  w/ice    []  w/heat    min []  Mechanical Traction: type/lbs                   []  pro   []  sup   []  int   []  cont    []  before manual    []  after manual    min []  Ultrasound, settings/location:      min []  Iontophoresis w/ dexamethasone, location:                                               []  take home patch       []  in clinic   10 min []  Ice     [x]  Heat    location/position: Supine, (L) shoulder.      min []  Vasopneumatic Device, press/temp:     min []  Other:    [x] Skin assessment post-treatment (if applicable):    [x]  intact    []  redness- no adverse reaction     []redness  adverse reaction:        19 min Therapeutic Exercise:  [x]  See flow sheet   Rationale:      increase ROM and increase strength to improve the patients ability to perform pain free ADLs. 7NB Min Manual Therapy: STM/DTM (L) pec minor. L PROM/stretch ER   Rationale:      decrease pain, increase ROM, increase tissue extensibility and decrease trigger points to improve patient's ability to perform pain free ADLs. The manual therapy interventions were performed at a separate and distinct time from the therapeutic activities interventions. 12 min Therapeutic Activity: [x]  See flow sheet   Rationale:    increase ROM, increase strength, improve coordination and improve balance to improve the patients ability to perform pain free ADLs. 8 min Self Care: Reviewed HEP. Educated pt on activity pacing with household activities to prevent further exacerbation of symptoms. Rationale:  to improve understanding of current diagnosis with realistic expectation of PT to improve compliance/adherence and satisfaction      Billed With/As:   [x] TE   [] TA   [] Neuro   [] Self Care Patient Education: [x] Review HEP    [] Progressed/Changed HEP based on:   [] positioning   [] body mechanics   [] transfers   [] heat/ice application    [] other:      Other Objective/Functional Measures:    *added IR stretch with strap to improve ROM. Able to achieve AAROM IR to L1 spinal level  *required cues for appropriate hold time and slow, controlled movements during all therex. Challenged with maintaining good form with IR/ER isometrics     Post Treatment Pain Level (on 0 to 10) scale:  0  / 10     ASSESSMENT  Assessment/Changes in Function:     Patient demonstrated fair tolerance with therex, noted occasional \"pull\" and \"stretch\" during strengthening therex, however denied sharp pain/red flags. Noted no pain following PT session.       []  See Progress Note/Recertification   Patient will continue to benefit from skilled PT services to modify and progress therapeutic interventions, address functional mobility deficits, address ROM deficits, address strength deficits, analyze and address soft tissue restrictions, analyze and cue movement patterns, analyze and modify body mechanics/ergonomics and assess and modify postural abnormalities to attain remaining goals. Progress toward goals / Updated goals:    New Goals to be achieved in __4__  weeks:  1. Patient will demonstrate independence with HEP for self management of symptoms  2. Improve FOTO Functional Status Score by 17 points in order to show significant functional improvement. 3. Demonstrate L shoulder BO to T1 and FIR to 2\" below RUE for improved ability to perform ADLs.  Slowly progressing 02/08       PLAN  [x]  Upgrade activities as tolerated yes Continue plan of care   []  Discharge due to :    []  Other:      Therapist: NASIR Cross    Date: 2/8/2021 Time: 9:50 AM     Future Appointments   Date Time Provider Rubio Velasco   2/8/2021  8:45 AM Stefania Hamilton [facility]   2/10/2021  8:45 AM Calvin Hamilton [address] [facility]   2/15/2021  9:30 AM Calvin Hamilton [facility]   2/17/2021  8:45 AM Stefania Hamilton [facility]   2/22/2021  8:45 AM Stefania Hamilton [facility]   2/24/2021  8:45 AM Martha Hamilton

## 2021-02-10 ENCOUNTER — HOSPITAL ENCOUNTER (OUTPATIENT)
Dept: PHYSICAL THERAPY | Age: 66
Discharge: HOME | End: 2021-02-10
Payer: MEDICAID

## 2021-02-10 PROCEDURE — 97530 THERAPEUTIC ACTIVITIES: CPT

## 2021-02-10 PROCEDURE — 97110 THERAPEUTIC EXERCISES: CPT

## 2021-02-10 NOTE — PROGRESS NOTES
PHYSICAL THERAPY - DAILY TREATMENT NOTE    Patient Name:  [address]        Date: 2/10/2021  : 1955   yes Patient  Verified  Visit #:     Insurance: Payor: [address] / Plan: [address] / Product Type: Managed Care Medicaid /       In time: 8:43 Out time: 9:36   Total Treatment Time: 53     Medicare/BCBS Time Tracking (below)   Total Timed Codes (min):  36 1:1 Treatment Time: 36     TREATMENT AREA =  Left shoulder pain [M25.512]    SUBJECTIVE  Pain Level (on 0 to 10 scale):  1  / 10   Medication Changes/New allergies or changes in medical history, any new surgeries or procedures?    no  If yes, update Summary List   Subjective Functional Status/Changes:  []  No changes reported     Patient reports not feeling too bad this morning since she had her family cook dinner last night. Denies any falls or red flags since LV. OBJECTIVE  Modalities Rationale:     decrease pain and increase tissue extensibility to improve patient's ability to perform pain free ADLs. min [] Estim, type/location:                                      []  att     []  unatt     []  w/US     []  w/ice    []  w/heat    min []  Mechanical Traction: type/lbs                   []  pro   []  sup   []  int   []  cont    []  before manual    []  after manual    min []  Ultrasound, settings/location:      min []  Iontophoresis w/ dexamethasone, location:                                               []  take home patch       []  in clinic   10 min []  Ice     [x]  Heat    location/position: to (L) shoulder in semirecline    min []  Vasopneumatic Device, press/temp:     min []  Other:    [x] Skin assessment post-treatment (if applicable):    [x]  intact    []  redness- no adverse reaction     []redness  adverse reaction:        20 min Therapeutic Exercise:  [x]  See flow sheet   Rationale:      increase ROM and increase strength to improve the patients ability to perform pain free ADLs. 7NB Min Manual Therapy: STM/DTM (L) pec minor and L LHB. contract/relax ER; L PROM/stretch ER;   Rationale:      decrease pain, increase ROM, increase tissue extensibility and decrease trigger points to improve patient's ability to perform pain free ADLs. The manual therapy interventions were performed at a separate and distinct time from the therapeutic activities interventions. 16 min Therapeutic Activity: [x]  See flow sheet   Rationale:    increase ROM, increase strength, improve coordination and improve balance to improve the patients ability to perform pain free ADLs. Billed With/As:   [x] TE   [] TA   [] Neuro   [] Self Care Patient Education: [x] Review HEP    [] Progressed/Changed HEP based on:   [] positioning   [] body mechanics   [] transfers   [] heat/ice application    [] other:      Other Objective/Functional Measures:    *increase time on pulleys and wall slides with towel to improve flexion ROM  *demo improved IR/ER submax iso technique with use of ball   *(+) shoulder hike with abduction upon initiation of GHJ elevation. Cues to decrease hike. Able to achieve near shoulder height following cues  *able to achieve L BO to top of head following MT     Post Treatment Pain Level (on 0 to 10) scale:  0 / 10     ASSESSMENT  Assessment/Changes in Function:     Patient continues to note reduce pain following PT session indicating good response to PT interventions.  Would continue to benefit from skilled PT interventions to further improve ROM and strength to return to PLOF     []  See Progress Note/Recertification   Patient will continue to benefit from skilled PT services to modify and progress therapeutic interventions, address functional mobility deficits, address ROM deficits, address strength deficits, analyze and address soft tissue restrictions, analyze and cue movement patterns, analyze and modify body mechanics/ergonomics and assess and modify postural abnormalities to attain remaining goals. Progress toward goals / Updated goals:    New Goals to be achieved in __4__  weeks:  1. Patient will demonstrate independence with HEP for self management of symptoms  2. Improve FOTO Functional Status Score by 17 points in order to show significant functional improvement. 3. Demonstrate L shoulder BO to T1 and FIR to 2\" below RUE for improved ability to perform ADLs.  Slowly progressing 02/08       PLAN  [x]  Upgrade activities as tolerated yes Continue plan of care   []  Discharge due to :    []  Other:      Therapist: NASIR Jones    Date: 2/10/2021 Time: 9:43 PM     Future Appointments   Date Time Provider Rubio Velasco   2/10/2021  8:45 AM Deirdre Hamilton [facility]   2/15/2021  9:30 AM Nicholas Hamilton [facility]   2/16/2021  8:30 AM [facility]   2/17/2021  8:45 AM Nicholas Hamilton [address]   2/22/2021  8:45 AM Deirdre Hamilton [facility]   2/24/2021  8:45 AM Re Hamilton

## 2021-02-15 ENCOUNTER — HOSPITAL ENCOUNTER (OUTPATIENT)
Dept: PHYSICAL THERAPY | Age: 66
Discharge: HOME | End: 2021-02-15
Payer: MEDICAID

## 2021-02-15 PROCEDURE — 97110 THERAPEUTIC EXERCISES: CPT

## 2021-02-15 PROCEDURE — 97535 SELF CARE MNGMENT TRAINING: CPT

## 2021-02-15 PROCEDURE — 97530 THERAPEUTIC ACTIVITIES: CPT

## 2021-02-15 NOTE — PROGRESS NOTES
PHYSICAL THERAPY - DAILY TREATMENT NOTE    Patient Name:  [address]        Date: 2/15/2021  : 1955   yes Patient  Verified  Visit #:   15   of   20  Insurance: Payor: Tomasa Wilson / Plan: [address] [place] [address] / Product Type: Managed Care Medicaid /       In time: 9:12 Out time: 10:16   Total Treatment Time: 64     Medicare/BCBS Time Tracking (below)   Total Timed Codes (min): 47 1:1 Treatment Time: 47     TREATMENT AREA =  Left shoulder pain [M25.512]    SUBJECTIVE  Pain Level (on 0 to 10 scale):  2  / 10   Medication Changes/New allergies or changes in medical history, any new surgeries or procedures?    no  If yes, update Summary List   Subjective Functional Status/Changes:  []  No changes reported     Patient reports cooking 2 lbs worth of pasta and was able to carry it and drain it without shoulder pain. Denies any falls or red flags since LV. OBJECTIVE  Modalities Rationale:     decrease pain and increase tissue extensibility to improve patient's ability to perform pain free ADLs.     min [] Estim, type/location:                                      []  att     []  unatt     []  w/US     []  w/ice    []  w/heat    min []  Mechanical Traction: type/lbs                   []  pro   []  sup   []  int   []  cont    []  before manual    []  after manual    min []  Ultrasound, settings/location:      min []  Iontophoresis w/ dexamethasone, location:                                               []  take home patch       []  in clinic   10 min []  Ice     [x]  Heat    location/position: to (L) shoulder in semirecline    min []  Vasopneumatic Device, press/temp:     min []  Other:    [x] Skin assessment post-treatment (if applicable):    [x]  intact    [x]  redness- no adverse reaction     []redness  adverse reaction:        20 min Therapeutic Exercise:  [x]  See flow sheet   Rationale:      increase ROM and increase strength to improve the patients ability to perform pain free ADLs.       7NB Min Manual Therapy: STM/DTM (L) pec minor and L post cuff; L PROM/stretch ER   Rationale:      decrease pain, increase ROM, increase tissue extensibility and decrease trigger points to improve patient's ability to perform pain free ADLs. The manual therapy interventions were performed at a separate and distinct time from the therapeutic activities interventions. 17 min Therapeutic Activity: [x]  See flow sheet   Rationale:    increase ROM, increase strength, improve coordination and improve balance to improve the patients ability to perform pain free ADLs. 10 min Self Care: Issued and reviewed updated HEP to include stretches. Educated pt on posture during prolonged sitting activities to prevent excessive forward rounded shoulders. Rationale:  to improve understanding of current diagnosis with realistic expectation of PT to improve compliance/adherence and satisfaction      Billed With/As:   [x] TE   [] TA   [] Neuro   [] Self Care Patient Education: [x] Review HEP    [] Progressed/Changed HEP based on:   [] positioning   [] body mechanics   [] transfers   [] heat/ice application    [] other:      Other Objective/Functional Measures:    *warm up on UBE today  *added doorway stretch to increase ROM   *increase tenderness in L post cuff noted during MT   *L functional ER post tx: to occiput with compensation   *L functional IR post tx: to L3 spinal level      Post Treatment Pain Level (on 0 to 10) scale:  0 / 10     ASSESSMENT  Assessment/Changes in Function:    Patient demonstrating slow and steady progress with ROM. Would continue to benefit from skilled PT to further improve L shoulder mobility.       []  See Progress Note/Recertification   Patient will continue to benefit from skilled PT services to modify and progress therapeutic interventions, address functional mobility deficits, address ROM deficits, address strength deficits, analyze and address soft tissue restrictions, analyze and cue movement patterns, analyze and modify body mechanics/ergonomics and assess and modify postural abnormalities to attain remaining goals. Progress toward goals / Updated goals:    New Goals to be achieved in __4__  weeks:  1. Patient will demonstrate independence with HEP for self management of symptoms  2. Improve FOTO Functional Status Score by 17 points in order to show significant functional improvement. 3. Demonstrate L shoulder BO to T1 and FIR to 2\" below RUE for improved ability to perform ADLs.  Slowly progressing 02/15       PLAN  [x]  Upgrade activities as tolerated yes Continue plan of care   []  Discharge due to :    []  Other:      Therapist: NASIR Reynolds    Date: 2/15/2021 Time: 10:42 AM     Future Appointments   Date Time Provider Rubio Velasco   2/15/2021  9:30 AM Sheron Dandy [facility]   2/16/2021  8:30 AM [address] [place] [address]   2/17/2021  8:45 AM Srinivasan Hamilton [facility] [address]   2/22/2021  8:45 AM Rico Hamilton [facility]   2/24/2021  8:45 AM Corwin Hamilton

## 2021-02-17 ENCOUNTER — HOSPITAL ENCOUNTER (OUTPATIENT)
Dept: PHYSICAL THERAPY | Age: 66
Discharge: HOME | End: 2021-02-17
Payer: MEDICAID

## 2021-02-17 PROCEDURE — 97110 THERAPEUTIC EXERCISES: CPT

## 2021-02-17 PROCEDURE — 97535 SELF CARE MNGMENT TRAINING: CPT

## 2021-02-17 PROCEDURE — 97530 THERAPEUTIC ACTIVITIES: CPT

## 2021-02-17 NOTE — PROGRESS NOTES
PHYSICAL THERAPY - DAILY TREATMENT NOTE    Patient Name:  [address]        Date: 2021  : 1955   yes Patient  Verified  Visit #:   15   of   20  Insurance: Payor: [employer] / Plan: [address] [place] [address] / Product Type: Managed Care Medicaid /       In time: 8:40 Out time: 9:36   Total Treatment Time: 56     Medicare/BCBS Time Tracking (below)   Total Timed Codes (min): 46 1:1 Treatment Time: 46     TREATMENT AREA =  Left shoulder pain [M25.512]    SUBJECTIVE  Pain Level (on 0 to 10 scale):  3- 10   Medication Changes/New allergies or changes in medical history, any new surgeries or procedures?    no  If yes, update Summary List   Subjective Functional Status/Changes:  []  No changes reported     Patient reports more shoulder pain today due to having her 15 y/o grandson in her bed and not being able to sleep the way she wanted. Denies any falls or red flags since LV. OBJECTIVE  Modalities Rationale:     decrease pain and increase tissue extensibility to improve patient's ability to perform pain free ADLs.     min [] Estim, type/location:                                      []  att     []  unatt     []  w/US     []  w/ice    []  w/heat    min []  Mechanical Traction: type/lbs                   []  pro   []  sup   []  int   []  cont    []  before manual    []  after manual    min []  Ultrasound, settings/location:      min []  Iontophoresis w/ dexamethasone, location:                                               []  take home patch       []  in clinic   10 min []  Ice     [x]  Heat    location/position: to (L) shoulder in semirecline    min []  Vasopneumatic Device, press/temp:     min []  Other:    [x] Skin assessment post-treatment (if applicable):    [x]  intact    [x]  redness- no adverse reaction     []redness  adverse reaction:        20 min Therapeutic Exercise:  [x]  See flow sheet   Rationale:      increase ROM and increase strength to improve the patients ability to perform pain free ADLs. 16 min Therapeutic Activity: [x]  See flow sheet   Rationale:    increase ROM, increase strength, improve coordination and improve balance to improve the patients ability to perform pain free ADLs. 10 min Self Care: Reviewed HEP and emphasized importance of HEP compliance to maximize gains made from PT. Rationale:  to improve understanding of current diagnosis with realistic expectation of PT to improve compliance/adherence and satisfaction        Billed With/As:   [x] TE   [] TA   [] Neuro   [] Self Care Patient Education: [x] Review HEP    [] Progressed/Changed HEP based on:   [] positioning   [] body mechanics   [] transfers   [] heat/ice application    [] other:      Other Objective/Functional Measures:    *increase repetitions for TB rows and ext to improve scapular strength   *added hammock stretch to increase shoulder ROM  *R functional ER to occiput, L functional ER to top of head  *performed seated flexion/scaption in front of mirror for visual cues. (+) shoulder hike at 90 deg of elevation. Able to demo no compensation prior to shoulder height     Post Treatment Pain Level (on 0 to 10) scale:  0/ 10     ASSESSMENT  Assessment/Changes in Function:    Demonstrating slow but steady progress with PT interventions indicated by increase ROM and decrease compensatory strategies      []  See Progress Note/Recertification   Patient will continue to benefit from skilled PT services to modify and progress therapeutic interventions, address functional mobility deficits, address ROM deficits, address strength deficits, analyze and address soft tissue restrictions, analyze and cue movement patterns, analyze and modify body mechanics/ergonomics and assess and modify postural abnormalities to attain remaining goals. Progress toward goals / Updated goals:    New Goals to be achieved in __4__  weeks:  1.  Patient will demonstrate independence with HEP for self management of symptoms  2. Improve FOTO Functional Status Score by 17 points in order to show significant functional improvement. 3. Demonstrate L shoulder BO to T1 and FIR to 2\" below RUE for improved ability to perform ADLs.  Slowly progressing 02/17       PLAN  [x]  Upgrade activities as tolerated yes Continue plan of care   []  Discharge due to :    []  Other:      Therapist: NASIR Arellano    Date: 2/17/2021 Time: 12:17 PM     Future Appointments   Date Time Provider Rubio Velasco   2/17/2021  8:45 AM Shira Hamilton [address]   2/22/2021  8:45 AM Shira Hamilton [address]   2/24/2021  8:45 AM Norma Hamilton

## 2021-02-22 ENCOUNTER — HOSPITAL ENCOUNTER (OUTPATIENT)
Dept: PHYSICAL THERAPY | Age: 66
Discharge: HOME | End: 2021-02-22
Payer: MEDICAID

## 2021-02-22 PROCEDURE — 97535 SELF CARE MNGMENT TRAINING: CPT

## 2021-02-22 PROCEDURE — 97530 THERAPEUTIC ACTIVITIES: CPT

## 2021-02-22 PROCEDURE — 97110 THERAPEUTIC EXERCISES: CPT

## 2021-02-22 NOTE — PROGRESS NOTES
PHYSICAL THERAPY - DAILY TREATMENT NOTE    Patient Name:  [address]        Date: 2021  : 1955   yes Patient  Verified  Visit #:   15   of   20  Insurance: Payor: [address] / Plan: [address] / Product Type: Managed Care Medicaid /       In time: 8:40 Out time: 9:35   Total Treatment Time: 55     Medicare/BCBS Time Tracking (below)   Total Timed Codes (min): 45 1:1 Treatment Time: 45     TREATMENT AREA =  Left shoulder pain [M25.512]    SUBJECTIVE  Pain Level (on 0 to 10 scale):  2 / 10   Medication Changes/New allergies or changes in medical history, any new surgeries or procedures?    no  If yes, update Summary List   Subjective Functional Status/Changes:  []  No changes reported     Patient reports being in the kitchen from 4pm to 11pm, cooking a lot of dishes. States that her L shoulder doesn't feel to bad, it's more her hands. Denies any falls or red flags since LV. OBJECTIVE  Modalities Rationale:     decrease pain and increase tissue extensibility to improve patient's ability to perform pain free ADLs.     min [] Estim, type/location:                                      []  att     []  unatt     []  w/US     []  w/ice    []  w/heat    min []  Mechanical Traction: type/lbs                   []  pro   []  sup   []  int   []  cont    []  before manual    []  after manual    min []  Ultrasound, settings/location:      min []  Iontophoresis w/ dexamethasone, location:                                               []  take home patch       []  in clinic   10 min []  Ice     [x]  Heat    location/position: to (L) shoulder in semirecline    min []  Vasopneumatic Device, press/temp:     min []  Other:    [x] Skin assessment post-treatment (if applicable):    [x]  intact    [x]  redness- no adverse reaction     []redness  adverse reaction:        20 min Therapeutic Exercise:  [x]  See flow sheet   Rationale:      increase ROM and increase strength to improve the patients ability to perform pain free ADLs. 15 min Therapeutic Activity: [x]  See flow sheet   Rationale:    increase ROM, increase strength, improve coordination and improve balance to improve the patients ability to perform pain free ADLs. 10 min Self Care: Reviewed HEP and emphasized importance of HEP compliance to maximize gains made from PT. Rationale:  to improve understanding of current diagnosis with realistic expectation of PT to improve compliance/adherence and satisfaction      Billed With/As:   [x] TE   [] TA   [] Neuro   [] Self Care Patient Education: [x] Review HEP    [] Progressed/Changed HEP based on:   [] positioning   [] body mechanics   [] transfers   [] heat/ice application    [] other:      Other Objective/Functional Measures:    *BO: R to occiput, L to occiput   *FIR: R to glute, L to greater trochanter     Post Treatment Pain Level (on 0 to 10) scale:  0/ 10     ASSESSMENT  Assessment/Changes in Function:    Patient demonstrating slow and steady progress with PT interventions. Would continue to benefit from skilled PT to continue to improve shoulder mobility and stability     []  See Progress Note/Recertification   Patient will continue to benefit from skilled PT services to modify and progress therapeutic interventions, address functional mobility deficits, address ROM deficits, address strength deficits, analyze and address soft tissue restrictions, analyze and cue movement patterns, analyze and modify body mechanics/ergonomics and assess and modify postural abnormalities to attain remaining goals. Progress toward goals / Updated goals:    New Goals to be achieved in __4__  weeks:  1. Patient will demonstrate independence with HEP for self management of symptoms  2. Improve FOTO Functional Status Score by 17 points in order to show significant functional improvement. 3. Demonstrate L shoulder BO to T1 and FIR to 2\" below RUE for improved ability to perform ADLs. Progressing 02/22    Reassess LTGs NV for monthly progress note       PLAN  [x]  Upgrade activities as tolerated yes Continue plan of care   []  Discharge due to :    []  Other:      Therapist: NASIR Olivares    Date: 2/22/2021 Time: 12:17 PM     Future Appointments   Date Time Provider Rubio Velasco   2/24/2021  8:45 AM May Hamilton

## 2021-03-03 ENCOUNTER — HOSPITAL ENCOUNTER (OUTPATIENT)
Dept: PHYSICAL THERAPY | Age: 66
Discharge: HOME | End: 2021-03-03
Payer: MEDICAID

## 2021-03-03 PROCEDURE — 97110 THERAPEUTIC EXERCISES: CPT

## 2021-03-03 PROCEDURE — 97535 SELF CARE MNGMENT TRAINING: CPT

## 2021-03-03 PROCEDURE — 97530 THERAPEUTIC ACTIVITIES: CPT

## 2021-03-03 NOTE — PROGRESS NOTES
PHYSICAL THERAPY - DAILY TREATMENT NOTE    Patient Name:  [address]        Date: 3/3/2021  : 1955   yes Patient  Verified  Visit #:     Insurance: Payor: [facility] / Plan: [address] [place] [address] / Product Type: Managed Care Medicaid /       In time: 8:44 Out time: 9:41   Total Treatment Time: 57     Medicare/BCBS Time Tracking (below)   Total Timed Codes (min): 47 1:1 Treatment Time: 47     TREATMENT AREA =  Left shoulder pain [M25.512]    SUBJECTIVE  Pain Level (on 0 to 10 scale):  2 / 10   Medication Changes/New allergies or changes in medical history, any new surgeries or procedures?    no  If yes, update Summary List   Subjective Functional Status/Changes:  []  No changes reported     Patient reports doing some things around the house that aggravated the shoulder, but doesn't feel too bad this morning. Denies any falls or red flags since LV. SEE PN       OBJECTIVE  Modalities Rationale:     decrease pain and increase tissue extensibility to improve patient's ability to perform pain free ADLs.     min [] Estim, type/location:                                      []  att     []  unatt     []  w/US     []  w/ice    []  w/heat    min []  Mechanical Traction: type/lbs                   []  pro   []  sup   []  int   []  cont    []  before manual    []  after manual    min []  Ultrasound, settings/location:      min []  Iontophoresis w/ dexamethasone, location:                                               []  take home patch       []  in clinic   10 min []  Ice     [x]  Heat    location/position: to (L) shoulder in semirecline    min []  Vasopneumatic Device, press/temp:     min []  Other:    [x] Skin assessment post-treatment (if applicable):    [x]  intact    [x]  redness- no adverse reaction     []redness  adverse reaction:        17 min Therapeutic Exercise:  [x]  See flow sheet   Rationale:      increase ROM and increase strength to improve the patients ability to perform pain free ADLs. 10 min Therapeutic Activity: [x]  See flow sheet   Rationale:    increase ROM, increase strength, improve coordination and improve balance to improve the patients ability to perform pain free ADLs. 20 min Self Care: Reassessment. Reviewed long-term HEP and emphasized importance of HEP compliance to maximize gains made from PT as well as self manage symptoms in prep for D/C. Rationale:  to improve understanding of current diagnosis with realistic expectation of PT to improve compliance/adherence and satisfaction      Billed With/As:   [x] TE   [] TA   [] Neuro   [] Self Care Patient Education: [x] Review HEP    [] Progressed/Changed HEP based on:   [] positioning   [] body mechanics   [] transfers   [] heat/ice application    [] other:      Other Objective/Functional Measures:    SEE PN     Post Treatment Pain Level (on 0 to 10) scale:  0 / 10     ASSESSMENT  Assessment/Changes in Function:    SEE PN     []  See Progress Note/Recertification   Patient will continue to benefit from skilled PT services to modify and progress therapeutic interventions, address functional mobility deficits, address ROM deficits, address strength deficits, analyze and address soft tissue restrictions, analyze and cue movement patterns, analyze and modify body mechanics/ergonomics and assess and modify postural abnormalities to attain remaining goals.    Progress toward goals / Updated goals:    SEE PN       PLAN  [x]  Upgrade activities as tolerated yes Continue plan of care   []  Discharge due to :    []  Other:      Therapist: NASIR Fontaine    Date: 3/3/2021 Time: 10:23 AM     Future Appointments   Date Time Provider Rubio Velasco   3/3/2021  8:45 AM Trip Hamilton [facility]   3/10/2021  8:45 AM [facility]   3/17/2021  8:45 AM [facility]   3/24/2021  8:45 AM [facility]   3/31/2021  8:45 AM Claudio Jeong

## 2021-03-03 NOTE — PROGRESS NOTES
[facility] PHYSICAL THERAPY   [facility],[facility], [facility] - Phone: [phone]  Fax: [phone]  PROGRESS NOTE  Patient Name: Julio Rich : 1955   Treatment/Medical Diagnosis: Left shoulder pain [M25.512]   Referral Source: Horace Maki MD     Date of Initial Visit: 2020 Attended Visits: 15 Missed Visits: 0     SUMMARY OF TREATMENT  Pt has attended 14 f/u PT visits since initial eval on 20 for L shoulder pain. PT interventions have included manual therapy (STM/DTM, PROM), therapeutic exercises, patient education, and HEP to improve shoulder ROM, mobility, strength, and stability. MHP PRN for pain control. CURRENT STATUS  Patient has made good progress overall with PT interventions for L shoulder pain. Patient reports 70% overall improvement since beginning PT interventions. Pain: current 2/10, at worst 4-5/10, at best 0/10  Aggravating factors: cooking big meals, wringing the mop, lifting heavy objects (ie: mattresses, box springs), pulling activiites (ie: cleaning under the fridge, changing bedsheets)   Functional improvements: reaching behind her (ie: putting on her jackets), reaching overhead   Alleviating factors: swinging the arm, shaking it  Pain description: twinge   Pain location: upper arm     Shoulder AROM Last PN   (21) Current PN (21)   flexion L 122 deg L 120 deg, R 160 deg   BO L C1 L C7, R C7   FIR L glute  L upper glute, R L5 spinal level       Goal/Measure of Progress Goal Met? 1. Patient will demonstrate independence with HEP for self management of symptoms   Status at last Eval: Partially compliant with HEP Current Status: Fair compliance progressing   2. Improve FOTO Functional Status Score by 17 points in order to show significant functional improvement. Status at last Eval: Initial eval: 44  Last PN: 57 Current Status: [address] yes   3.   Demonstrate L shoulder BO to T1 and FIR to 2\" below RUE for improved ability to perform ADLs. Status at last Eval: See above Current Status: See above yes     New Goals to be achieved in __4__  weeks:  1. Patient will demonstrate independence with HEP for self management of symptoms  2. Patient will be able to carry >/= to 15 lbs with bilateral hands and without elevation of pain to improve ease with cooking activities. RECOMMENDATIONS  Pt to continue 1x weekly for up to an additional 4 weeks to further improve shoulder strength/stability for ADLs, decrease pain, and prepare for DC to HEP. Thank you for this referral.    If you have any questions/comments please contact us directly at [phone]. Thank you for allowing us to assist in the care of your patient. NASIR Signature: NASIR Edwards Date: 3/3/2021   PT Signature: Reji Peralta DPT Time: 5:48 PM   NOTE TO PHYSICIAN:  PLEASE COMPLETE THE ORDERS BELOW AND FAX TO   [facility]: [phone]  If you are unable to process this request in 24 hours please contact our office: [phone]    ___ I have read the above report and request that my patient continue as recommended.   ___ I have read the above report and request that my patient continue therapy with the following changes/special instructions:_________________________________________________________   ___ I have read the above report and request that my patient be discharged from therapy.      Physician Signature:        Date:       Time:    Venkatesh Queen MD

## 2021-03-10 ENCOUNTER — HOSPITAL ENCOUNTER (OUTPATIENT)
Dept: PHYSICAL THERAPY | Age: 66
Discharge: HOME | End: 2021-03-10
Payer: MEDICAID

## 2021-03-10 PROCEDURE — 97110 THERAPEUTIC EXERCISES: CPT

## 2021-03-10 PROCEDURE — 97530 THERAPEUTIC ACTIVITIES: CPT

## 2021-03-10 NOTE — PROGRESS NOTES
PHYSICAL THERAPY - DAILY TREATMENT NOTE    Patient Name:  [address]        Date: 3/10/2021  : 1955   yes Patient  Verified  Visit #:     Insurance: Payor: [employer] / Plan: [address] / Product Type: Managed Care Medicaid /       In time: 8:50 Out time: 9:43   Total Treatment Time: 53     Medicare/BCBS Time Tracking (below)   Total Timed Codes (min): 36 1:1 Treatment Time: 36     TREATMENT AREA =  Left shoulder pain [M25.512]    SUBJECTIVE  Pain Level (on 0 to 10 scale):  2 / 10   Medication Changes/New allergies or changes in medical history, any new surgeries or procedures?    no  If yes, update Summary List   Subjective Functional Status/Changes:  []  No changes reported     Patient reports doing a lot the last few days with family and friends. States that she's been noncompliant with her HEP        OBJECTIVE  Modalities Rationale:     decrease pain and increase tissue extensibility to improve patient's ability to perform pain free ADLs. min [] Estim, type/location:                                      []  att     []  unatt     []  w/US     []  w/ice    []  w/heat    min []  Mechanical Traction: type/lbs                   []  pro   []  sup   []  int   []  cont    []  before manual    []  after manual    min []  Ultrasound, settings/location:      min []  Iontophoresis w/ dexamethasone, location:                                               []  take home patch       []  in clinic   10 min []  Ice     [x]  Heat    location/position: to (L) shoulder in semirecline    min []  Vasopneumatic Device, press/temp:     min []  Other:    [x] Skin assessment post-treatment (if applicable):    [x]  intact    [x]  redness- no adverse reaction     []redness  adverse reaction:        20 min Therapeutic Exercise:  [x]  See flow sheet   Rationale:      increase ROM and increase strength to improve the patients ability to perform pain free ADLs.        16 min Therapeutic Activity: [x]  See flow sheet   Rationale:    increase ROM, increase strength, improve coordination and improve balance to improve the patients ability to perform pain free ADLs. 7NB min Manual Therapy: DTM to L pec minor, L anterior capsule; GHJ posterior mobs    Rationale:      decrease pain, increase ROM, increase tissue extensibility and decrease trigger points to improve patient's ability to perform functional ADLs. The manual therapy interventions were performed at a separate and distinct time from the therapeutic activities interventions. Billed With/As:   [x] TE   [] TA   [] Neuro   [] Self Care Patient Education: [x] Review HEP    [] Progressed/Changed HEP based on:   [] positioning   [] body mechanics   [] transfers   [] heat/ice application    [] other:      Other Objective/Functional Measures:    *progressed to finger ladder to improve flexion AROM and increase repetitions with IR/extension stretch to increase ROM  *demo minimal shoulder hike upon initiation of seated flexion/scaption AROM     Post Treatment Pain Level (on 0 to 10) scale:  0 / 10     ASSESSMENT  Assessment/Changes in Function:    Patient continues to note no pain following PT session. Continued to heavily educate/emphasize importance with HEP compliance to maximize gains made from PT and self manage symptoms at home.      []  See Progress Note/Recertification   Patient will continue to benefit from skilled PT services to modify and progress therapeutic interventions, address functional mobility deficits, address ROM deficits, address strength deficits, analyze and address soft tissue restrictions, analyze and cue movement patterns, analyze and modify body mechanics/ergonomics and assess and modify postural abnormalities to attain remaining goals. Progress toward goals / Updated goals:    Long Term Goals: To be accomplished in 4 weeks: 1. Goal: Patient will demonstrate independence with HEP for self management of symptoms Status at last note/certification: fair compliance; no progress 03/10, reports non compliance  2. Goal: Patient will be able to carry >/= to 15 lbs with bilateral hands and without elevation of pain to improve ease with cooking activities.           Status at last note/certification: painful          PLAN  [x]  Upgrade activities as tolerated yes Continue plan of care   []  Discharge due to :    []  Other:      Therapist: NASIR Cross    Date: 3/10/2021 Time: 11:32 AM     Future Appointments   Date Time Provider Rubio Velasco   3/11/2021  8:30 AM [address] [place] [address]   3/17/2021  8:45 AM Stefania Hamilton Sam [facility]   3/24/2021  8:45 AM Calvin Hamilton [facility]   3/31/2021  8:45 AM Martha Hamilton

## 2021-03-17 ENCOUNTER — HOSPITAL ENCOUNTER (OUTPATIENT)
Dept: PHYSICAL THERAPY | Age: 66
Discharge: HOME | End: 2021-03-17
Payer: MEDICAID

## 2021-03-17 PROCEDURE — 97110 THERAPEUTIC EXERCISES: CPT

## 2021-03-17 PROCEDURE — 97530 THERAPEUTIC ACTIVITIES: CPT

## 2021-03-17 PROCEDURE — 97535 SELF CARE MNGMENT TRAINING: CPT

## 2021-03-17 NOTE — PROGRESS NOTES
PHYSICAL THERAPY - DAILY TREATMENT NOTE    Patient Name:  [address]        Date: 3/17/2021  : 1955   yes Patient  Verified  Visit #:     Insurance: Payor: Sekou Lesaingrid / Plan: [address] [place] [address] / Product Type: Managed Care Medicaid /       In time: 10:15 Out time: 11:10   Total Treatment Time: 55     Medicare/BCBS Time Tracking (below)   Total Timed Codes (min): 45 1:1 Treatment Time: 45     TREATMENT AREA =  Left shoulder pain [M25.512]    SUBJECTIVE  Pain Level (on 0 to 10 scale):  2 / 10   Medication Changes/New allergies or changes in medical history, any new surgeries or procedures?    no  If yes, update Summary List   Subjective Functional Status/Changes:  []  No changes reported     Patient reports not doing her exercises too much at home \"because I've been really lazy\". States that she threw away a lot of trash last night but took breaks to make sure she didn't overdo it       OBJECTIVE  Modalities Rationale:     decrease pain and increase tissue extensibility to improve patient's ability to perform pain free ADLs.     min [] Estim, type/location:                                      []  att     []  unatt     []  w/US     []  w/ice    []  w/heat    min []  Mechanical Traction: type/lbs                   []  pro   []  sup   []  int   []  cont    []  before manual    []  after manual    min []  Ultrasound, settings/location:      min []  Iontophoresis w/ dexamethasone, location:                                               []  take home patch       []  in clinic   10 min []  Ice     [x]  Heat    location/position: to (L) shoulder in semirecline    min []  Vasopneumatic Device, press/temp:     min []  Other:    [x] Skin assessment post-treatment (if applicable):    [x]  intact    [x]  redness- no adverse reaction     []redness  adverse reaction:        20 min Therapeutic Exercise:  [x]  See flow sheet   Rationale:      increase ROM and increase strength to improve the patients ability to perform pain free ADLs. 15 min Therapeutic Activity: [x]  See flow sheet   Rationale:    increase ROM, increase strength, improve coordination and improve balance to improve the patients ability to perform pain free ADLs. 10 min Self Care: Reviewed HEP. Emphasized importance with HEP compliance    Rationale:  to improve understanding of current diagnosis with realistic expectation of PT to improve compliance/adherence and satisfaction          Billed With/As:   [x] TE   [] TA   [] Neuro   [] Self Care Patient Education: [x] Review HEP    [] Progressed/Changed HEP based on:   [] positioning   [] body mechanics   [] transfers   [] heat/ice application    [] other:      Other Objective/Functional Measures:    *continued therex per flowsheet   *added waist<>chest lift/carry with 12 lb dumbbell to improve UE strength     Post Treatment Pain Level (on 0 to 10) scale:  0 / 10     ASSESSMENT  Assessment/Changes in Function:    Patient continues to note reduced pain levels following PT sessions and demonstrates good understanding of current PT program. Patient will be appropriate transition to DC in 2 visits. Will continue to emphasize importance of HEP compliance to improve carryover      []  See Progress Note/Recertification   Patient will continue to benefit from skilled PT services to modify and progress therapeutic interventions, address functional mobility deficits, address ROM deficits, address strength deficits, analyze and address soft tissue restrictions, analyze and cue movement patterns, analyze and modify body mechanics/ergonomics and assess and modify postural abnormalities to attain remaining goals. Progress toward goals / Updated goals:    Long Term Goals: To be accomplished in 4 weeks: 1. Goal: Patient will demonstrate independence with HEP for self management of symptoms              Status at last note/certification: fair compliance; slow progress 03/17, reports doing it a little bit  2. Goal: Patient will be able to carry >/= to 15 lbs with bilateral hands and without elevation of pain to improve ease with cooking activities.           Status at last note/certification: painful; progressing 03/17 able to perform waist<>chest carry/lift with 12 lb dumbbell without pain       PLAN  [x]  Upgrade activities as tolerated yes Continue plan of care   []  Discharge due to :    []  Other:      Therapist: Dolph Prader, LPTA    Date: 3/17/2021 Time: 12:24 PM     Future Appointments   Date Time Provider Rubio Velasco   3/17/2021 10:15 AM Clover Hamilton [address]   3/24/2021  8:45 AM Sapna Hamilton [address] [facility] [address]   3/31/2021  8:45 AM Sapna Hamilton 200 [facility] [address]

## 2021-03-24 ENCOUNTER — HOSPITAL ENCOUNTER (OUTPATIENT)
Dept: PHYSICAL THERAPY | Age: 66
Discharge: HOME | End: 2021-03-24
Payer: MEDICAID

## 2021-03-24 PROCEDURE — 97110 THERAPEUTIC EXERCISES: CPT

## 2021-03-24 PROCEDURE — 97535 SELF CARE MNGMENT TRAINING: CPT

## 2021-03-24 PROCEDURE — 97530 THERAPEUTIC ACTIVITIES: CPT

## 2021-03-24 NOTE — PROGRESS NOTES
PHYSICAL THERAPY - DAILY TREATMENT NOTE    Patient Name: Charito Boss        Date: 3/24/2021  : 1955   yes Patient  Verified  Visit #:     Insurance: Payor: Ez Acuñaden / Plan: [address] [place] [address] / Product Type: Managed Care Medicaid /       In time: 9:42 Out time: 10:38   Total Treatment Time: 56     Medicare/BCBS Time Tracking (below)   Total Timed Codes (min): 46 1:1 Treatment Time: 46     TREATMENT AREA =  Left shoulder pain [M25.512]    SUBJECTIVE  Pain Level (on 0 to 10 scale):  1 / 10   Medication Changes/New allergies or changes in medical history, any new surgeries or procedures?    no  If yes, update Summary List   Subjective Functional Status/Changes:  []  No changes reported     Patient reports doing a lot of baking and spring cleaning. States that she's been reaching a lot and not having much pain in the L shoulder. Denies any falls or red flags since LV. OBJECTIVE  Modalities Rationale:     decrease pain and increase tissue extensibility to improve patient's ability to perform pain free ADLs.     min [] Estim, type/location:                                      []  att     []  unatt     []  w/US     []  w/ice    []  w/heat    min []  Mechanical Traction: type/lbs                   []  pro   []  sup   []  int   []  cont    []  before manual    []  after manual    min []  Ultrasound, settings/location:      min []  Iontophoresis w/ dexamethasone, location:                                               []  take home patch       []  in clinic   10 min []  Ice     [x]  Heat    location/position: to (L) shoulder in semirecline    min []  Vasopneumatic Device, press/temp:     min []  Other:    [x] Skin assessment post-treatment (if applicable):    [x]  intact    [x]  redness- no adverse reaction     []redness  adverse reaction:        20 min Therapeutic Exercise:  [x]  See flow sheet   Rationale:      increase ROM and increase strength to improve the patients ability to perform pain free ADLs. 16 min Therapeutic Activity: [x]  See flow sheet   Rationale:    increase ROM, increase strength, improve coordination and improve balance to improve the patients ability to perform pain free ADLs. 10 min Self Care: Educated pt on body mechanics and posture during cooking activities. Discussed activity pacing with \"spring cleaning\" activities. Rationale:  to improve understanding of current diagnosis with realistic expectation of PT to improve compliance/adherence and satisfaction    Billed With/As:   [x] TE   [] TA   [] Neuro   [] Self Care Patient Education: [x] Review HEP    [] Progressed/Changed HEP based on:   [] positioning   [] body mechanics   [] transfers   [] heat/ice application    [] other:      Other Objective/Functional Measures:    *increase weight with waist to chest lift/carry to improve strength for household activities      Post Treatment Pain Level (on 0 to 10) scale:  0 / 10     ASSESSMENT  Assessment/Changes in Function:    Patient requires minimal to no cues with current PT program indicating good understanding of current program. Patient will be appropriate to transition to D/C to home with long-term HEP. []  See Progress Note/Recertification   Patient will continue to benefit from skilled PT services to modify and progress therapeutic interventions, address functional mobility deficits, address ROM deficits, address strength deficits, analyze and address soft tissue restrictions, analyze and cue movement patterns, analyze and modify body mechanics/ergonomics and assess and modify postural abnormalities to attain remaining goals. Progress toward goals / Updated goals:    Long Term Goals: To be accomplished in 4 weeks:  1. Patient will demonstrate independence with HEP for self management of symptoms              Status at last note/certification: fair compliance; slow progress 03/17, reports doing it a little bit  2.  Patient will be able to carry >/= to 15 lbs with bilateral hands and without elevation of pain to improve ease with cooking activities.           Status at last note/certification: painful; progressing 03/24 able to perform waist<>chest carry/lift with 15 lb dumbbell without pain       PLAN  [x]  Upgrade activities as tolerated yes Continue plan of care   []  Discharge due to :    []  Other:      Therapist: Chinita Scheuermann, LPTA    Date: 3/24/2021 Time: 10:53 AM     Future Appointments   Date Time Provider Rubio Velasco   3/31/2021  8:45 AM Yolanda Hamilton

## 2021-03-31 ENCOUNTER — HOSPITAL ENCOUNTER (OUTPATIENT)
Dept: PHYSICAL THERAPY | Age: 66
Discharge: HOME | End: 2021-03-31
Payer: MEDICAID

## 2021-03-31 PROCEDURE — 97535 SELF CARE MNGMENT TRAINING: CPT

## 2021-03-31 PROCEDURE — 97110 THERAPEUTIC EXERCISES: CPT

## 2021-03-31 NOTE — PROGRESS NOTES
[address], [address] [place] [address], [facility] - Phone: [phone]  Fax: [phone]  DISCHARGE SUMMARY  Patient Name: Sam Kirk : 1955   Treatment/Medical Diagnosis: Left shoulder pain [M25.512]   Referral Source: Jesus Lepe MD     Date of Initial Visit: 20 Attended Visits: 20 Missed Visits: 0     SUMMARY OF TREATMENT  Pt has attended 19 f/u PT visits since initial eval on 20 for L shoulder pain. PT interventions have included manual therapy (STM/DTM, PROM), therapeutic exercises, patient education, and HEP to improve shoulder ROM, mobility, strength, and stability. MHP PRN for pain control. CURRENT STATUS  Patient has made good gains with PT interventions for L shoulder pain. Reports 80% overall improvement since beginning PT. Demonstrates fair compliance with HEP. In the last 2 week, pain has ranged between 0-3/10 and current pain as 1/10. Notes a \"pulling sensation\" in anterior shoulder when reaching behind her and pain with cold weather. Notices functional improvement with her ability to reach overhead, cook, and bathe. Patient is now discharged from physical therapy due to meeting PT goals and demonstrating good understanding of self management of symptoms with HEP/modalities. Thank you for this referral.     Shoulder   AROM Current  (21)   flexion L 120 deg  R 160 deg   BO L C7  R C7   FIR L L1 spinal level  R L5 spinal level       Shoulder   strength  Current  (21)   flexion L 3+/5  R 4/5   ER L 4/5  R 4/5   IR L 5/5  R 5/5         Goal/Measure of Progress Goal Met? 1. Patient will demonstrate independence with HEP for self management of symptoms   Status at last Eval: HEP estbalished Current Status: I and compliant yes   2. Patient will be able to carry >/= to 15 lbs with bilateral hands and without elevation of pain to improve ease with cooking activities.     Status at last Eval: Painful  Current Status: Able without pain yes     RECOMMENDATIONS  Discontinue therapy. Progressing towards or have reached established goals. Thank you for this referral.     If you have any questions/comments please contact us directly at [phone]. Thank you for allowing us to assist in the care of your patient. Therapist Signature: NASIR Perez Date: 03/31/21    German Nagy PT, DPT   Time: 10:26 AM     NOTE TO PHYSICIAN:  Your patient's insurance requires this discharge note be signed and returned. PLEASE COMPLETE THE ORDERS BELOW AND RETURN TO:  [facility]    ___ I have read the above report and request that my patient be discharged from therapy.      Physician Signature:        Date:       Time:                                        Jesus Lepe MD

## 2021-03-31 NOTE — PROGRESS NOTES
PHYSICAL THERAPY - DAILY TREATMENT NOTE    Patient Name:  M[place]        Date: 3/31/2021  : 1955   yes Patient  Verified  Visit #:     Insurance: Payor: Arnaldoelmer Sep / Plan: [address] [place] [address] / Product Type: Managed Care Medicaid /       In time: 8:49 Out time: 9:26   Total Treatment Time: 37     Medicare/BCBS Time Tracking (below)   Total Timed Codes (min): 37 1:1 Treatment Time: 37     TREATMENT AREA =  Left shoulder pain [M25.512]    SUBJECTIVE  Pain Level (on 0 to 10 scale):  1 / 10   Medication Changes/New allergies or changes in medical history, any new surgeries or procedures?    no  If yes, update Summary List   Subjective Functional Status/Changes:  []  No changes reported     SEE D/C  Denies any falls or red flags since LV. OBJECTIVE    17 min Therapeutic Exercise:  [x]  See flow sheet   Rationale:      increase ROM and increase strength to improve the patients ability to perform pain free ADLs. 20 min Self Care: Reassessment. Reviewed long-term HEP and issued appropriate resistance bands. Educated at length importance with HEP compliance in order to maximize gains made from PT. Reviewed body mechanics and activity pacing with household activities and ADLs.    Rationale:  to improve understanding of current diagnosis with realistic expectation of PT to improve compliance/adherence and satisfaction    Billed With/As:   [x] TE   [] TA   [] Neuro   [] Self Care Patient Education: [x] Review HEP    [] Progressed/Changed HEP based on:   [] positioning   [] body mechanics   [] transfers   [] heat/ice application    [] other:      Other Objective/Functional Measures:    SEE D/C     Post Treatment Pain Level (on 0 to 10) scale:  0 / 10     ASSESSMENT  Assessment/Changes in Function:    SEE D/C     []  See Progress Note/Recertification   Patient will continue to benefit from skilled PT services to modify and progress therapeutic interventions, address functional mobility deficits, address ROM deficits, address strength deficits, analyze and address soft tissue restrictions, analyze and cue movement patterns, analyze and modify body mechanics/ergonomics and assess and modify postural abnormalities to attain remaining goals.    Progress toward goals / Updated goals:    SEE D/C       PLAN  []  Upgrade activities as tolerated no Continue plan of care   [x]  Discharge due to : Completed PT program; progressing towards PT goals   []  Other:      Therapist: NASIR El    Date: 3/31/2021 Time: 9:37 AM      Future Appointments   Date Time Provider Rubio Velasco   3/31/2021  8:45 AM Deep Hamilton

## 2022-01-20 ENCOUNTER — TRANSCRIBE ORDER (OUTPATIENT)
Dept: SCHEDULING | Age: 67
End: 2022-01-20

## 2022-01-20 DIAGNOSIS — N18.30 CHRONIC KIDNEY DISEASE, STAGE III (MODERATE) (HCC): Primary | ICD-10-CM

## 2022-03-08 ENCOUNTER — HOSPITAL ENCOUNTER (OUTPATIENT)
Dept: ULTRASOUND IMAGING | Age: 67
Discharge: HOME | End: 2022-03-08
Attending: INTERNAL MEDICINE
Payer: MEDICARE

## 2022-03-08 DIAGNOSIS — N18.30 CHRONIC KIDNEY DISEASE, STAGE III (MODERATE) (HCC): ICD-10-CM

## 2022-03-08 PROCEDURE — 76770 US EXAM ABDO BACK WALL COMP: CPT

## 2022-05-19 NOTE — PROGRESS NOTES
PHYSICAL THERAPY - DAILY TREATMENT NOTE    Patient Name:  [address]        Date: 2021  : 1955   yes Patient  Verified  Visit #:     Insurance: Payor: [address] [place] [address] / Plan: [address] / Product Type: Managed Care Medicaid /      In time: 7:04 am Out time: 8:07 am   Total Treatment Time: 63     Medicare/BCBS Time Tracking (below)   Total Timed Codes (min):  53 1:1 Treatment Time:  na     TREATMENT AREA =  Left shoulder pain [M25.512]    SUBJECTIVE  Pain Level (on 0 to 10 scale):  2  / 10   Medication Changes/New allergies or changes in medical history, any new surgeries or procedures?    no  If yes, update Summary List   Subjective Functional Status/Changes:  []  No changes reported     Patient reports her shoulder isn't too bad this morning, didn't cook that much this weekend. She reports her shoulder has been feeling better, hasn't been hurting as much and is able to sit in a chair without a pillow support under her arm.          OBJECTIVE  Modalities Rationale:     decrease pain and increase tissue extensibility to improve patient's ability to perform ADLs   min [] Estim, type/location:                                      []  att     []  unatt     []  w/US     []  w/ice    []  w/heat    min []  Mechanical Traction: type/lbs                   []  pro   []  sup   []  int   []  cont    []  before manual    []  after manual    min []  Ultrasound, settings/location:      min []  Iontophoresis w/ dexamethasone, location:                                               []  take home patch       []  in clinic   10 min []  Ice     [x]  Heat    location/position: L shoulder in semi-reclined with bolster    min []  Vasopneumatic Device, press/temp:     min []  Other:    [x] Skin assessment post-treatment (if applicable):    [x]  intact    []  redness- no adverse reaction     []redness  adverse reaction:        20 min Therapeutic Exercise:  [x]  See flow sheet   Rationale: increase ROM, increase strength and improve coordination to improve the patients ability to perform ADLs     7 nb min Manual Therapy: STM/DTM L UT, posterior rotator cuff, pec major, lat deltoid   Rationale:      decrease pain, increase ROM and increase tissue extensibility to improve patient's ability to perform ADLs  The manual therapy interventions were performed at a separate and distinct time from the therapeutic activities interventions. 15 min Therapeutic Activity: [x]  See flow sheet   Rationale:    increase ROM, increase strength and improve coordination to improve the patients ability to perform overhead reaching and lifting    11 min Neuromuscular Re-ed: [x]  See flow sheet   Rationale:    increase ROM, increase strength and improve coordination to improve the patients ability to perform ADLs and lifting with decreased pain     Billed With/As:   [x] TE   [] TA   [] Neuro   [] Self Care Patient Education: [x] Review HEP    [] Progressed/Changed HEP based on:   [] positioning   [] body mechanics   [] transfers   [] heat/ice application    [] other:      Other Objective/Functional Measures: Added seated shoulder flexion and scaption with max cuing for upright posture and retracted scapula, minimizing shoulder hike. Progressed reps of several exercises for continued strength and endurance gains. Patient continues to require cuing during active exercises for proper posture and positioning as patient demonstrates significant forward flexed posture. Post Treatment Pain Level (on 0 to 10) scale:   0  / 10     ASSESSMENT  Assessment/Changes in Function:     Patient continues to make good progress toward goals.  Reports max pain at 4/10, meeting STG #2.      []  See Progress Note/Recertification   Patient will continue to benefit from skilled PT services to modify and progress therapeutic interventions, address functional mobility deficits, address ROM deficits, address strength deficits, analyze [[id number]] and address soft tissue restrictions, analyze and cue movement patterns and analyze and modify body mechanics/ergonomics to attain remaining goals. Progress toward goals / Updated goals:  Short Term Goals: To be accomplished in  2  weeks:  1. Patient will demonstrate independence with HEP for self management of symptoms MET  2.  Decrease max pain to 4-5/10 o assist with overhead reaching MET 1/25, max pain 4/10     PLAN  [x]  Upgrade activities as tolerated yes Continue plan of care   []  Discharge due to :    []  Other:      Therapist: Erica Saunders PT, DPT    Date: 1/25/2021 Time: 7:06 AM     Future Appointments   Date Time Provider uRbio Velasco   1/27/2021  8:00 [facility]1/2021 10:15 AM [facility]   2[facility]3/2021 10:15 AM [facility]   2/8/2021  8:45 AM Giovanni [facility]   2/10/2021  8:45 AM Giovanni Hamilton [facility]   2/15/2021  9:30 AM Giovanni Hamilton [facility]   2/17/2021  8:45 AM Giovanni Hamilton [facility]   2/22/2021  8:45 AM Giovanni Hamilton [facility]   2/24/2021  8:45 AM Giovanni Hamilton [facility]